# Patient Record
Sex: FEMALE | Race: ASIAN | Employment: FULL TIME | URBAN - METROPOLITAN AREA
[De-identification: names, ages, dates, MRNs, and addresses within clinical notes are randomized per-mention and may not be internally consistent; named-entity substitution may affect disease eponyms.]

---

## 2018-08-09 ENCOUNTER — OFFICE VISIT (OUTPATIENT)
Dept: INTERNAL MEDICINE CLINIC | Facility: CLINIC | Age: 43
End: 2018-08-09
Payer: COMMERCIAL

## 2018-08-09 ENCOUNTER — LAB ENCOUNTER (OUTPATIENT)
Dept: LAB | Age: 43
End: 2018-08-09
Attending: INTERNAL MEDICINE
Payer: COMMERCIAL

## 2018-08-09 VITALS
HEIGHT: 64 IN | WEIGHT: 141 LBS | RESPIRATION RATE: 16 BRPM | SYSTOLIC BLOOD PRESSURE: 105 MMHG | DIASTOLIC BLOOD PRESSURE: 72 MMHG | HEART RATE: 71 BPM | BODY MASS INDEX: 24.07 KG/M2

## 2018-08-09 DIAGNOSIS — Z91.018 FOOD ALLERGY: ICD-10-CM

## 2018-08-09 DIAGNOSIS — Z12.31 SCREENING MAMMOGRAM, ENCOUNTER FOR: ICD-10-CM

## 2018-08-09 DIAGNOSIS — Z00.00 PHYSICAL EXAM: ICD-10-CM

## 2018-08-09 DIAGNOSIS — Z00.00 PHYSICAL EXAM: Primary | ICD-10-CM

## 2018-08-09 PROBLEM — L98.9 SKIN LESION OF HAND: Status: ACTIVE | Noted: 2018-08-09

## 2018-08-09 LAB
ALBUMIN SERPL BCP-MCNC: 4.1 G/DL (ref 3.5–4.8)
ALBUMIN/GLOB SERPL: 1.3 {RATIO} (ref 1–2)
ALP SERPL-CCNC: 31 U/L (ref 32–100)
ALT SERPL-CCNC: 16 U/L (ref 14–54)
ANION GAP SERPL CALC-SCNC: 6 MMOL/L (ref 0–18)
AST SERPL-CCNC: 20 U/L (ref 15–41)
BASOPHILS # BLD: 0 K/UL (ref 0–0.2)
BASOPHILS NFR BLD: 0 %
BILIRUB SERPL-MCNC: 0.4 MG/DL (ref 0.3–1.2)
BUN SERPL-MCNC: 10 MG/DL (ref 8–20)
BUN/CREAT SERPL: 14.3 (ref 10–20)
CALCIUM SERPL-MCNC: 9.2 MG/DL (ref 8.5–10.5)
CHLORIDE SERPL-SCNC: 109 MMOL/L (ref 95–110)
CHOLEST SERPL-MCNC: 178 MG/DL (ref 110–200)
CO2 SERPL-SCNC: 24 MMOL/L (ref 22–32)
CREAT SERPL-MCNC: 0.7 MG/DL (ref 0.5–1.5)
EOSINOPHIL # BLD: 0.1 K/UL (ref 0–0.7)
EOSINOPHIL NFR BLD: 1 %
ERYTHROCYTE [DISTWIDTH] IN BLOOD BY AUTOMATED COUNT: 15.9 % (ref 11–15)
GLOBULIN PLAS-MCNC: 3.1 G/DL (ref 2.5–3.7)
GLUCOSE SERPL-MCNC: 93 MG/DL (ref 70–99)
HCT VFR BLD AUTO: 32.1 % (ref 35–48)
HDLC SERPL-MCNC: 60 MG/DL
HGB BLD-MCNC: 10.6 G/DL (ref 12–16)
LDLC SERPL CALC-MCNC: 109 MG/DL (ref 0–99)
LYMPHOCYTES # BLD: 1.4 K/UL (ref 1–4)
LYMPHOCYTES NFR BLD: 29 %
MCH RBC QN AUTO: 26.3 PG (ref 27–32)
MCHC RBC AUTO-ENTMCNC: 33 G/DL (ref 32–37)
MCV RBC AUTO: 79.7 FL (ref 80–100)
MONOCYTES # BLD: 0.4 K/UL (ref 0–1)
MONOCYTES NFR BLD: 9 %
NEUTROPHILS # BLD AUTO: 3 K/UL (ref 1.8–7.7)
NEUTROPHILS NFR BLD: 61 %
NONHDLC SERPL-MCNC: 118 MG/DL
OSMOLALITY UR CALC.SUM OF ELEC: 287 MOSM/KG (ref 275–295)
PATIENT FASTING: YES
PLATELET # BLD AUTO: 425 K/UL (ref 140–400)
PMV BLD AUTO: 8 FL (ref 7.4–10.3)
POTASSIUM SERPL-SCNC: 4.4 MMOL/L (ref 3.3–5.1)
PROT SERPL-MCNC: 7.2 G/DL (ref 5.9–8.4)
RBC # BLD AUTO: 4.03 M/UL (ref 3.7–5.4)
SODIUM SERPL-SCNC: 139 MMOL/L (ref 136–144)
TRIGL SERPL-MCNC: 44 MG/DL (ref 1–149)
TSH SERPL-ACNC: 0.7 UIU/ML (ref 0.45–5.33)
WBC # BLD AUTO: 4.9 K/UL (ref 4–11)

## 2018-08-09 PROCEDURE — 80053 COMPREHEN METABOLIC PANEL: CPT

## 2018-08-09 PROCEDURE — 85025 COMPLETE CBC W/AUTO DIFF WBC: CPT

## 2018-08-09 PROCEDURE — 99386 PREV VISIT NEW AGE 40-64: CPT | Performed by: INTERNAL MEDICINE

## 2018-08-09 PROCEDURE — 80061 LIPID PANEL: CPT

## 2018-08-09 PROCEDURE — 36415 COLL VENOUS BLD VENIPUNCTURE: CPT

## 2018-08-09 PROCEDURE — 84443 ASSAY THYROID STIM HORMONE: CPT

## 2018-08-09 RX ORDER — EPINEPHRINE 0.3 MG/.3ML
0.3 INJECTION SUBCUTANEOUS ONCE
Qty: 1 EACH | Refills: 0 | Status: SHIPPED | OUTPATIENT
Start: 2018-08-09 | End: 2018-08-09

## 2018-08-09 NOTE — PROGRESS NOTES
Devin Izaguirre is a 41year old female.   Patient presents with:  Physical      HPI:   Pt comes as a new pt   Her  moved to [employer] [facility] [employer] as a nurse and planning on going back to school for NP   C/c physical  C/o physical   Has peeling dry skin and u developed, well nourished,in no apparent distress  SKIN: no rashes,no suspicious lesions  HEENT: atraumatic, normocephalic,ears and throat are clear, no frontal or maxillary sinus tenderness   NECK: supple,no adenopathy,non tender   LUNGS: clear to auscult

## 2018-08-09 NOTE — PATIENT INSTRUCTIONS
Prevention Guidelines, Women Ages 36 to 52  Screening tests and vaccines are an important part of managing your health. A screening test is done to find possible disorders or diseases in people who don't have any symptoms.  The goal is to find a disease e Depression All women in this age group At routine exams   Gonorrhea Sexually active women at increased risk for infection At routine exams   Hepatitis C Anyone at increased risk; 1 time for those born between [facility] At routine exams   High cholester Meningococcal Women at increased risk for infection–talk with your healthcare provider 1 or more doses   Pneumococcal conjugate vaccine (PCV13) and pneumococcal polysaccharide vaccine (PPSV23) Women at increased risk for infection–talk with your healthcare

## 2018-11-15 ENCOUNTER — OFFICE VISIT (OUTPATIENT)
Dept: INTERNAL MEDICINE CLINIC | Facility: CLINIC | Age: 43
End: 2018-11-15
Payer: COMMERCIAL

## 2018-11-15 VITALS
SYSTOLIC BLOOD PRESSURE: 135 MMHG | DIASTOLIC BLOOD PRESSURE: 83 MMHG | WEIGHT: 146 LBS | HEART RATE: 87 BPM | BODY MASS INDEX: 24.92 KG/M2 | HEIGHT: 64 IN

## 2018-11-15 DIAGNOSIS — L98.9 SKIN LESION OF HAND: Primary | ICD-10-CM

## 2018-11-15 DIAGNOSIS — L25.9 CONTACT DERMATITIS, UNSPECIFIED CONTACT DERMATITIS TYPE, UNSPECIFIED TRIGGER: ICD-10-CM

## 2018-11-15 DIAGNOSIS — D50.8 IRON DEFICIENCY ANEMIA SECONDARY TO INADEQUATE DIETARY IRON INTAKE: ICD-10-CM

## 2018-11-15 DIAGNOSIS — S99.922A INJURY OF TOE ON LEFT FOOT, INITIAL ENCOUNTER: ICD-10-CM

## 2018-11-15 DIAGNOSIS — L98.9 SKIN LESION: ICD-10-CM

## 2018-11-15 PROCEDURE — 99212 OFFICE O/P EST SF 10 MIN: CPT | Performed by: INTERNAL MEDICINE

## 2018-11-15 PROCEDURE — 99214 OFFICE O/P EST MOD 30 MIN: CPT | Performed by: INTERNAL MEDICINE

## 2018-11-15 RX ORDER — CHLORHEXIDINE GLUCONATE 4 G/100ML
30 SOLUTION TOPICAL
Qty: 473 ML | Refills: 0 | Status: SHIPPED | OUTPATIENT
Start: 2018-11-15 | End: 2019-01-09

## 2018-11-15 NOTE — PATIENT INSTRUCTIONS
Non-scented for hypoallergenic sensitive skin type products that are non-fragmented--she states she already does  Asked her try Pepcid 20 mg twice a day, Benadryl 25 mg twice a day or at least at night, loratadine 10 mg once a day  Advised patient to use g is:  · Blistered  · Burning  · Cracked  · Dry  · Itchy  · Painful  · Red  · Rough, thickened, and leathery  · Swollen  · Warm  The blisters may ooze fluid and form crusts.   Treatment for contact dermatitis  Treatment is done to help relieve itching and red reserved. This information is not intended as a substitute for professional medical care. Always follow your healthcare professional's instructions.         Contact Dermatitis  Contact dermatitis is a skin rash caused by something that touches the skin and wet piece of clothing under a dry one. Wear a damp shirt under a dry shirt if your upper body is affected. This can relieve itching and prevent you from scratching the affected area.   · You can also help relieve large areas of itching by taking a lukewarm for professional medical care. Always follow your healthcare professional's instructions.       Non-scented for hypoallergenic sensitive skin type products that are non-fragmented--she states she already does  Asked her try Pepcid 20 mg twice a day, [employer]

## 2018-11-15 NOTE — PROGRESS NOTES
Jovita Albrecht is a 41year old female.   Patient presents with:  Rash: face, arms and legs itchy      HPI:   Pt comes as an urgent visit   C/c rash  C/o rash  No changes in soaps detergents fabric softeners perfumes etc.  Has allergies and sees allergist but sweats, fatigue  SKIN:as above   RESPIRATORY: denies shortness of breath, cough, wheezing  CARDIOVASCULAR: denies chest pain on exertion, palpitations, swelling in feet  GI: denies abdominal pain and denies heartburn, nausea or vomiting  NEURO: denies head aware it may fall off , becomes more thicker painful difficult to put on his shoe any symptoms she may need to see a podiatrist to have the nail pulled out        Preventative medicine   Flu shot - utd - at work 9/18 at [employer]     The patient indicates unde

## 2019-01-09 ENCOUNTER — OFFICE VISIT (OUTPATIENT)
Dept: INTERNAL MEDICINE CLINIC | Facility: CLINIC | Age: 44
End: 2019-01-09
Payer: COMMERCIAL

## 2019-01-09 VITALS
HEIGHT: 64 IN | WEIGHT: 140 LBS | HEART RATE: 109 BPM | SYSTOLIC BLOOD PRESSURE: 128 MMHG | DIASTOLIC BLOOD PRESSURE: 86 MMHG | BODY MASS INDEX: 23.9 KG/M2

## 2019-01-09 DIAGNOSIS — R22.0 SWELLING OF FACE: ICD-10-CM

## 2019-01-09 DIAGNOSIS — R21 RASH: Primary | ICD-10-CM

## 2019-01-09 PROCEDURE — 99212 OFFICE O/P EST SF 10 MIN: CPT | Performed by: INTERNAL MEDICINE

## 2019-01-09 PROCEDURE — 99213 OFFICE O/P EST LOW 20 MIN: CPT | Performed by: INTERNAL MEDICINE

## 2019-01-09 RX ORDER — PREDNISONE 10 MG/1
TABLET ORAL
Qty: 20 TABLET | Refills: 0 | Status: SHIPPED | OUTPATIENT
Start: 2019-01-09 | End: 2019-02-20

## 2019-01-09 RX ORDER — MONTELUKAST SODIUM 10 MG/1
10 TABLET ORAL DAILY
Qty: 90 TABLET | Refills: 3 | Status: SHIPPED | OUTPATIENT
Start: 2019-01-09 | End: 2019-04-01 | Stop reason: ALTCHOICE

## 2019-01-09 NOTE — PROGRESS NOTES
Gretta Perez is a 41year old female.   Patient presents with:  Rash: face, arms very itchy has appt with Derm Monday       HPI:   Pt comes for f/u/emergent visit   C/c swollen eyes   C/o swollen eyes x 5 days to one week -- on and off x 2 weeks   No changes 1 tablet (10 mg total) by mouth daily. Disp: 90 tablet Rfl: 3   mupirocin 2 % External Ointment  Disp:  Rfl: 3   triamcinolone acetonide 0.1 % External Cream  Disp:  Rfl: 1      History reviewed. No pertinent past medical history.    Past Surgical History: prescribed prednisone, take the Benadryl twice a day if needed  Today is wed - Go to work tomorrow because it was over time and she is off on Friday and Saturday she has to go back but she will call me on Friday to let me know if she is fine enough to go b

## 2019-01-09 NOTE — PATIENT INSTRUCTIONS
Controlling Asthma Triggers: Allergens     Wash bedding in hot water (130°F) each week. For many people with lung problems such as asthma or COPD, inhaling allergens leads to inflamed airways.  Allergens also cause other types of reactions in some peo Mold grows in damp places, such as bathrooms, basements, and closets. It can grow anywhere flooding or a fire has caused water damage. Mold can live behind the walls if there has been water damage. ? Clean damp areas weekly to prevent mold growth.  This in Take Pepcid 20 mg twice daily for the next 7 days, take loratadine or Claritin 10 mg once a day for the next 7 days, take the prescribed prednisone, take the Benadryl twice a day if needed

## 2019-01-14 ENCOUNTER — OFFICE VISIT (OUTPATIENT)
Dept: DERMATOLOGY CLINIC | Facility: CLINIC | Age: 44
End: 2019-01-14
Payer: COMMERCIAL

## 2019-01-14 DIAGNOSIS — L30.9 DERMATITIS: ICD-10-CM

## 2019-01-14 DIAGNOSIS — L30.1 CHRONIC VESICULAR HAND DERMATITIS: Primary | ICD-10-CM

## 2019-01-14 PROCEDURE — 99202 OFFICE O/P NEW SF 15 MIN: CPT | Performed by: DERMATOLOGY

## 2019-01-14 PROCEDURE — 99212 OFFICE O/P EST SF 10 MIN: CPT | Performed by: DERMATOLOGY

## 2019-01-14 NOTE — PROGRESS NOTES
History reviewed. No pertinent past medical history.   Past Surgical History:   Procedure Laterality Date   •      •      • OTHER SURGICAL HISTORY      exploratory lap after giving birth due to extra uterine bleed      Social History

## 2019-01-14 NOTE — PROGRESS NOTES
HPI:     Chief Complaint     Lesion        HPI     Lesion      Additional comments: New pt. Pt presents c/o itchy lesions on hands that blister present for several months that come and go.  Pt has tried Clobetasol, Fluocinonide, Eucrisa and Mupirocin withou mupirocin 2 % External Ointment  Disp:  Rfl: 3   triamcinolone acetonide 0.1 % External Cream  Disp:  Rfl: 1   Montelukast Sodium (SINGULAIR) 10 MG Oral Tab Take 1 tablet (10 mg total) by mouth daily.  Disp: 90 tablet Rfl: 3     Allergies:   No Known Tenzin times does not respond to even high potency topical steroids, I think patient should be patch tested.   She will set up appointments for patch testing today.-I have told her she needs to be off the prednisone at least a week before the patch testing and dominguez

## 2019-02-20 ENCOUNTER — OFFICE VISIT (OUTPATIENT)
Dept: INTERNAL MEDICINE CLINIC | Facility: CLINIC | Age: 44
End: 2019-02-20
Payer: COMMERCIAL

## 2019-02-20 VITALS
SYSTOLIC BLOOD PRESSURE: 128 MMHG | WEIGHT: 144.81 LBS | BODY MASS INDEX: 24.72 KG/M2 | HEART RATE: 82 BPM | DIASTOLIC BLOOD PRESSURE: 74 MMHG | HEIGHT: 64 IN

## 2019-02-20 DIAGNOSIS — L98.9 SKIN LESION OF HAND: Primary | ICD-10-CM

## 2019-02-20 PROCEDURE — 99213 OFFICE O/P EST LOW 20 MIN: CPT | Performed by: INTERNAL MEDICINE

## 2019-02-20 PROCEDURE — 99212 OFFICE O/P EST SF 10 MIN: CPT | Performed by: INTERNAL MEDICINE

## 2019-02-20 RX ORDER — PREDNISONE 20 MG/1
20 TABLET ORAL DAILY
Qty: 4 TABLET | Refills: 0 | Status: SHIPPED | OUTPATIENT
Start: 2019-02-20 | End: 2019-02-24

## 2019-02-20 NOTE — PROGRESS NOTES
Serene Grove is a 41year old female.   Patient presents with:  Rash: follow up      HPI:   Pt comes for f/u  C/c rash  C/o rash is getting worse   Pt has apt on march 12th, 14th and 16th  for skintesting - and has a f/u apt on 30th   Was on eucresa oint whi kg/m²   GENERAL: well developed, well nourished,in no apparent distress  SKIN: Right hand medial aspect of the her little finger more than the left hand and also on the palmar surface few dry patches mostly on the left hand ring finger and little finger, n

## 2019-02-21 ENCOUNTER — OFFICE VISIT (OUTPATIENT)
Dept: DERMATOLOGY CLINIC | Facility: CLINIC | Age: 44
End: 2019-02-21
Payer: COMMERCIAL

## 2019-02-21 DIAGNOSIS — L30.9 DERMATITIS: ICD-10-CM

## 2019-02-21 DIAGNOSIS — L30.1 CHRONIC VESICULAR HAND DERMATITIS: Primary | ICD-10-CM

## 2019-02-21 PROCEDURE — 99213 OFFICE O/P EST LOW 20 MIN: CPT | Performed by: DERMATOLOGY

## 2019-02-21 PROCEDURE — 99212 OFFICE O/P EST SF 10 MIN: CPT | Performed by: DERMATOLOGY

## 2019-02-21 RX ORDER — EMOLLIENT COMBINATION NO.32
1 EMULSION, EXTENDED RELEASE TOPICAL 2 TIMES DAILY
Qty: 225 G | Refills: 3 | Status: SHIPPED | OUTPATIENT
Start: 2019-02-21 | End: 2019-10-08

## 2019-02-21 RX ORDER — CEPHALEXIN 500 MG/1
500 CAPSULE ORAL 2 TIMES DAILY
Qty: 14 CAPSULE | Refills: 1 | Status: SHIPPED | OUTPATIENT
Start: 2019-02-21 | End: 2019-02-28

## 2019-02-21 NOTE — PROGRESS NOTES
HPI:     Chief Complaint     Lesion        HPI     Lesion      Additional comments: LOV 1/14/19. pt presenting today with possible rash to both hands. c/o of itching and dryness.           Last edited by Laila Camara, [address] [facility] on 2/21/2019  2:34 PM. (History) 225 g Rfl: 3   Montelukast Sodium (SINGULAIR) 10 MG Oral Tab Take 1 tablet (10 mg total) by mouth daily. Disp: 90 tablet Rfl: 3   predniSONE 20 MG Oral Tab Take 1 tablet (20 mg total) by mouth daily for 4 days.  Disp: 4 tablet Rfl: 0   mupirocin 2 % Externa sexual activity: Not on file    Other Topics      Concerns:        Grew up on a farm: Not Asked        History of tanning: Not Asked        Outdoor occupation: Not Asked        Breast feeding: Not Asked        Reaction to local anesthetic: No    Social His clobetasol in this area. Patient will follow-up in 2 weeks for the patch testing. I did recommend that she call us in a few days to let us know if she is getting improvement from the topicals.     No orders of the defined types were placed in this encount

## 2019-03-12 ENCOUNTER — OFFICE VISIT (OUTPATIENT)
Dept: DERMATOLOGY CLINIC | Facility: CLINIC | Age: 44
End: 2019-03-12
Payer: COMMERCIAL

## 2019-03-12 DIAGNOSIS — L30.1 CHRONIC VESICULAR HAND DERMATITIS: Primary | ICD-10-CM

## 2019-03-12 PROCEDURE — 95044 PATCH/APPLICATION TESTS: CPT | Performed by: DERMATOLOGY

## 2019-03-12 NOTE — PROGRESS NOTES
HPI:     Chief Complaint     Derm Problem        HPI     Derm Problem      Additional comments: LOV 02/21/2019 Patient present for patch test # 1          Last edited by Wolf Cervantes on 3/12/2019 10:11 AM. (History)        Patient is here today for Yes        Comment: social       Drug use: No      Sexual activity: Not on file    Lifestyle      Physical activity:        Days per week: Not on file        Minutes per session: Not on file      Stress: Not on file    Relationships      Social connections

## 2019-03-14 ENCOUNTER — OFFICE VISIT (OUTPATIENT)
Dept: DERMATOLOGY CLINIC | Facility: CLINIC | Age: 44
End: 2019-03-14
Payer: COMMERCIAL

## 2019-03-14 DIAGNOSIS — L30.9 DERMATITIS: Primary | ICD-10-CM

## 2019-03-14 PROCEDURE — 99212 OFFICE O/P EST SF 10 MIN: CPT | Performed by: DERMATOLOGY

## 2019-03-14 PROCEDURE — 99211 OFF/OP EST MAY X REQ PHY/QHP: CPT | Performed by: DERMATOLOGY

## 2019-03-16 ENCOUNTER — OFFICE VISIT (OUTPATIENT)
Dept: DERMATOLOGY CLINIC | Facility: CLINIC | Age: 44
End: 2019-03-16
Payer: COMMERCIAL

## 2019-03-16 DIAGNOSIS — L30.1 CHRONIC VESICULAR HAND DERMATITIS: Primary | ICD-10-CM

## 2019-03-16 PROCEDURE — 99212 OFFICE O/P EST SF 10 MIN: CPT | Performed by: DERMATOLOGY

## 2019-03-16 PROCEDURE — 99213 OFFICE O/P EST LOW 20 MIN: CPT | Performed by: DERMATOLOGY

## 2019-03-16 NOTE — PROGRESS NOTES
.  HPI:     Chief Complaint     Derm Problem        HPI     Derm Problem      Additional comments: Pt presents for patch test #3.            Last edited by Chichi Chaves RN on 3/16/2019  9:56 AM. (History)         Patient is here for her third patch test Tobacco Use      Smoking status: Never Smoker      Smokeless tobacco: Never Used    Substance and Sexual Activity      Alcohol use: Yes        Comment: social       Drug use: No      Sexual activity: Not on file    Lifestyle      Physical activity: more problems on the palms were as her reaction is more on the dorsal hands. Patient will continue the clobetasol ointment along with the On license of [facility] as needed. She will follow-up if flares or new issues.     No orders of the defined types were placed in [facility]

## 2019-04-01 ENCOUNTER — OFFICE VISIT (OUTPATIENT)
Dept: INTERNAL MEDICINE CLINIC | Facility: CLINIC | Age: 44
End: 2019-04-01
Payer: COMMERCIAL

## 2019-04-01 ENCOUNTER — LAB ENCOUNTER (OUTPATIENT)
Dept: LAB | Age: 44
End: 2019-04-01
Attending: INTERNAL MEDICINE
Payer: COMMERCIAL

## 2019-04-01 VITALS
WEIGHT: 146 LBS | BODY MASS INDEX: 24.92 KG/M2 | SYSTOLIC BLOOD PRESSURE: 122 MMHG | DIASTOLIC BLOOD PRESSURE: 75 MMHG | HEART RATE: 83 BPM | TEMPERATURE: 98 F | HEIGHT: 64 IN

## 2019-04-01 DIAGNOSIS — Z02.89 ENCOUNTER FOR COMPLETION OF FORM WITH PATIENT: ICD-10-CM

## 2019-04-01 DIAGNOSIS — L30.1 CHRONIC VESICULAR HAND DERMATITIS: Primary | ICD-10-CM

## 2019-04-01 PROCEDURE — 36415 COLL VENOUS BLD VENIPUNCTURE: CPT

## 2019-04-01 PROCEDURE — 85025 COMPLETE CBC W/AUTO DIFF WBC: CPT

## 2019-04-01 PROCEDURE — 99213 OFFICE O/P EST LOW 20 MIN: CPT | Performed by: INTERNAL MEDICINE

## 2019-04-01 PROCEDURE — 80053 COMPREHEN METABOLIC PANEL: CPT

## 2019-04-01 PROCEDURE — 99212 OFFICE O/P EST SF 10 MIN: CPT | Performed by: INTERNAL MEDICINE

## 2019-04-01 PROCEDURE — 80061 LIPID PANEL: CPT

## 2019-04-01 RX ORDER — CLOBETASOL PROPIONATE 0.5 MG/G
OINTMENT TOPICAL AS NEEDED
COMMUNITY
End: 2020-09-08

## 2019-04-01 NOTE — PROGRESS NOTES
Chelly Grey is a 41year old female. Patient presents with:  Complete Form: Per patient work needs form for work, wellness check form.  Patient did not bring form to office visit.         waist circum- 34 in       HPI:   Pt here for f/u   C/c rash on hands External Ointment  Disp:  Rfl: 3   triamcinolone acetonide 0.1 % External Cream  Disp:  Rfl: 1      No past medical history on file.    Past Surgical History:   Procedure Laterality Date   •      •      • Other surgical history      expl

## 2019-04-02 ENCOUNTER — MED REC SCAN ONLY (OUTPATIENT)
Dept: INTERNAL MEDICINE CLINIC | Facility: CLINIC | Age: 44
End: 2019-04-02

## 2019-08-26 ENCOUNTER — LAB ENCOUNTER (OUTPATIENT)
Dept: LAB | Age: 44
End: 2019-08-26
Attending: INTERNAL MEDICINE
Payer: COMMERCIAL

## 2019-08-26 ENCOUNTER — OFFICE VISIT (OUTPATIENT)
Dept: INTERNAL MEDICINE CLINIC | Facility: CLINIC | Age: 44
End: 2019-08-26
Payer: COMMERCIAL

## 2019-08-26 ENCOUNTER — APPOINTMENT (OUTPATIENT)
Dept: LAB | Age: 44
End: 2019-08-26
Attending: INTERNAL MEDICINE
Payer: COMMERCIAL

## 2019-08-26 VITALS
BODY MASS INDEX: 25.54 KG/M2 | SYSTOLIC BLOOD PRESSURE: 120 MMHG | TEMPERATURE: 98 F | HEART RATE: 74 BPM | DIASTOLIC BLOOD PRESSURE: 77 MMHG | WEIGHT: 149.63 LBS | HEIGHT: 64 IN

## 2019-08-26 DIAGNOSIS — R07.89 ATYPICAL CHEST PAIN: ICD-10-CM

## 2019-08-26 DIAGNOSIS — S16.1XXA STRAIN OF NECK MUSCLE, INITIAL ENCOUNTER: ICD-10-CM

## 2019-08-26 DIAGNOSIS — R12 HEARTBURN: ICD-10-CM

## 2019-08-26 DIAGNOSIS — Z01.419 ENCOUNTER FOR ROUTINE GYNECOLOGICAL EXAMINATION WITH PAPANICOLAOU SMEAR OF CERVIX: ICD-10-CM

## 2019-08-26 DIAGNOSIS — Z12.31 SCREENING MAMMOGRAM, ENCOUNTER FOR: ICD-10-CM

## 2019-08-26 DIAGNOSIS — D50.0 IRON DEFICIENCY ANEMIA DUE TO CHRONIC BLOOD LOSS: ICD-10-CM

## 2019-08-26 DIAGNOSIS — Z00.00 PHYSICAL EXAM: ICD-10-CM

## 2019-08-26 DIAGNOSIS — Z00.00 PHYSICAL EXAM: Primary | ICD-10-CM

## 2019-08-26 LAB
ALBUMIN SERPL-MCNC: 3.9 G/DL (ref 3.4–5)
ALBUMIN/GLOB SERPL: 1 {RATIO} (ref 1–2)
ALP LIVER SERPL-CCNC: 44 U/L (ref 37–98)
ALT SERPL-CCNC: 20 U/L (ref 13–56)
ANION GAP SERPL CALC-SCNC: 7 MMOL/L (ref 0–18)
AST SERPL-CCNC: 15 U/L (ref 15–37)
BASOPHILS # BLD AUTO: 0.06 X10(3) UL (ref 0–0.2)
BASOPHILS NFR BLD AUTO: 1.1 %
BILIRUB SERPL-MCNC: 0.3 MG/DL (ref 0.1–2)
BUN BLD-MCNC: 12 MG/DL (ref 7–18)
BUN/CREAT SERPL: 16.4 (ref 10–20)
CALCIUM BLD-MCNC: 8.9 MG/DL (ref 8.5–10.1)
CHLORIDE SERPL-SCNC: 108 MMOL/L (ref 98–112)
CHOLEST SMN-MCNC: 213 MG/DL (ref ?–200)
CO2 SERPL-SCNC: 26 MMOL/L (ref 21–32)
CREAT BLD-MCNC: 0.73 MG/DL (ref 0.55–1.02)
DEPRECATED RDW RBC AUTO: 46.7 FL (ref 35.1–46.3)
EOSINOPHIL # BLD AUTO: 0.09 X10(3) UL (ref 0–0.7)
EOSINOPHIL NFR BLD AUTO: 1.6 %
ERYTHROCYTE [DISTWIDTH] IN BLOOD BY AUTOMATED COUNT: 15.9 % (ref 11–15)
GLOBULIN PLAS-MCNC: 4 G/DL (ref 2.8–4.4)
GLUCOSE BLD-MCNC: 93 MG/DL (ref 70–99)
HCT VFR BLD AUTO: 33.9 % (ref 35–48)
HDLC SERPL-MCNC: 82 MG/DL (ref 40–59)
HGB BLD-MCNC: 10.5 G/DL (ref 12–16)
IMM GRANULOCYTES # BLD AUTO: 0.01 X10(3) UL (ref 0–1)
IMM GRANULOCYTES NFR BLD: 0.2 %
LDLC SERPL CALC-MCNC: 117 MG/DL (ref ?–100)
LYMPHOCYTES # BLD AUTO: 1.64 X10(3) UL (ref 1–4)
LYMPHOCYTES NFR BLD AUTO: 28.8 %
M PROTEIN MFR SERPL ELPH: 7.9 G/DL (ref 6.4–8.2)
MCH RBC QN AUTO: 25.3 PG (ref 26–34)
MCHC RBC AUTO-ENTMCNC: 31 G/DL (ref 31–37)
MCV RBC AUTO: 81.7 FL (ref 80–100)
MONOCYTES # BLD AUTO: 0.48 X10(3) UL (ref 0.1–1)
MONOCYTES NFR BLD AUTO: 8.4 %
NEUTROPHILS # BLD AUTO: 3.42 X10 (3) UL (ref 1.5–7.7)
NEUTROPHILS # BLD AUTO: 3.42 X10(3) UL (ref 1.5–7.7)
NEUTROPHILS NFR BLD AUTO: 59.9 %
NONHDLC SERPL-MCNC: 131 MG/DL (ref ?–130)
OSMOLALITY SERPL CALC.SUM OF ELEC: 291 MOSM/KG (ref 275–295)
PATIENT FASTING: YES
PATIENT FASTING: YES
PLATELET # BLD AUTO: 500 10(3)UL (ref 150–450)
POTASSIUM SERPL-SCNC: 4.2 MMOL/L (ref 3.5–5.1)
RBC # BLD AUTO: 4.15 X10(6)UL (ref 3.8–5.3)
SODIUM SERPL-SCNC: 141 MMOL/L (ref 136–145)
TRIGL SERPL-MCNC: 68 MG/DL (ref 30–149)
TSI SER-ACNC: 1.44 MIU/ML (ref 0.36–3.74)
VLDLC SERPL CALC-MCNC: 14 MG/DL (ref 0–30)
WBC # BLD AUTO: 5.7 X10(3) UL (ref 4–11)

## 2019-08-26 PROCEDURE — 80061 LIPID PANEL: CPT

## 2019-08-26 PROCEDURE — 93010 ELECTROCARDIOGRAM REPORT: CPT | Performed by: INTERNAL MEDICINE

## 2019-08-26 PROCEDURE — 99396 PREV VISIT EST AGE 40-64: CPT | Performed by: INTERNAL MEDICINE

## 2019-08-26 PROCEDURE — 80053 COMPREHEN METABOLIC PANEL: CPT

## 2019-08-26 PROCEDURE — 85025 COMPLETE CBC W/AUTO DIFF WBC: CPT

## 2019-08-26 PROCEDURE — 93005 ELECTROCARDIOGRAM TRACING: CPT

## 2019-08-26 PROCEDURE — 84443 ASSAY THYROID STIM HORMONE: CPT

## 2019-08-26 PROCEDURE — 36415 COLL VENOUS BLD VENIPUNCTURE: CPT

## 2019-08-26 RX ORDER — CYCLOBENZAPRINE HCL 10 MG
10 TABLET ORAL NIGHTLY PRN
Qty: 10 TABLET | Refills: 0 | Status: SHIPPED | OUTPATIENT
Start: 2019-08-26 | End: 2019-09-15

## 2019-08-26 RX ORDER — CYCLOBENZAPRINE HCL 10 MG
10 TABLET ORAL 3 TIMES DAILY
Qty: 30 TABLET | Refills: 1 | Status: SHIPPED | OUTPATIENT
Start: 2019-08-26 | End: 2019-08-26

## 2019-08-26 NOTE — PATIENT INSTRUCTIONS
Prevention Guidelines, Women Ages 36 to 52  Screening tests and vaccines are an important part of managing your health. A screening test is done to find diseases in people who don't have any symptoms.  The goal is to find a disease early so lifestyle pineda · Flexible sigmoidoscopy every 5 years, or  · Colonoscopy every 10 years, or  · CT colonography (virtual colonoscopy) every 5 years, or  · Yearly fecal occult blood test, or  · Yearly fecal immunochemical test every year, or  · Stool DNA test, every 3 year Chickenpox (varicella) All women in this age group who have no record of this infection or vaccine 2 doses; the second dose should be given at least 4 weeks after the first dose   Hepatitis A Women at increased risk for infection–talk with your healthcare Use of tobacco and the health effects it can cause All women in this age group Every exam   1 American Diabetes Association  2 American College of Obstetricians and Gynecologists   [address]  [address] [facility]  Date Last R

## 2019-08-26 NOTE — PROGRESS NOTES
Tamika Lucas is a 45year old female.   Patient presents with:  Physical: annual px      HPI:   Pt comes for a physical  C/c physical   C/o 4 days ago had come chest pressure and out like some skipped beats and her heart rate so she went to work and she had mupirocin 2 % External Ointment  Disp:  Rfl: 3   triamcinolone acetonide 0.1 % External Cream  Disp:  Rfl: 1      No past medical history on file.    Past Surgical History:   Procedure Laterality Date   •      •      • Other surgical histo with flexion looking to the right or looking to the left,   LUNGS: clear to auscultation, no wheeze  CARDIO: RRR without murmur  GI: good BS's,no masses or tenderness  EXTREMITIES: no cyanosis, or edema  BREAST: Bilateral breasts without any lumps, bilater

## 2019-10-09 RX ORDER — EMOLLIENT COMBINATION NO.32
EMULSION, EXTENDED RELEASE TOPICAL
Qty: 225 G | Refills: 2 | Status: SHIPPED | OUTPATIENT
Start: 2019-10-09

## 2019-10-11 ENCOUNTER — OFFICE VISIT (OUTPATIENT)
Dept: OBGYN CLINIC | Facility: CLINIC | Age: 44
End: 2019-10-11
Payer: COMMERCIAL

## 2019-10-11 VITALS
SYSTOLIC BLOOD PRESSURE: 111 MMHG | HEART RATE: 92 BPM | WEIGHT: 150 LBS | BODY MASS INDEX: 26 KG/M2 | DIASTOLIC BLOOD PRESSURE: 66 MMHG

## 2019-10-11 DIAGNOSIS — Z01.419 ENCOUNTER FOR GYNECOLOGICAL EXAMINATION: Primary | ICD-10-CM

## 2019-10-11 DIAGNOSIS — Z12.4 SCREENING FOR MALIGNANT NEOPLASM OF CERVIX: ICD-10-CM

## 2019-10-11 DIAGNOSIS — Z12.31 ENCOUNTER FOR SCREENING MAMMOGRAM FOR BREAST CANCER: ICD-10-CM

## 2019-10-11 PROCEDURE — 99386 PREV VISIT NEW AGE 40-64: CPT | Performed by: OBSTETRICS & GYNECOLOGY

## 2019-10-11 NOTE — PROGRESS NOTES
Clayton Kelsey is a 45year old female [id number] Patient's last menstrual period was 2019. here for annual exam.       New pt. Menses q month x 5 days. Condoms. Declined STD screen.     States she had exploratory lap after last  due to internal blee 92   Wt 150 lb (68 kg)   LMP 09/14/2019   BMI 25.75 kg/m²   Wt Readings from Last 2 Encounters:  10/11/19 : 150 lb (68 kg)  08/26/19 : 149 lb 9.6 oz (67.9 kg)    Body mass index is 25.75 kg/m².     Constitutional: well developed, well nourished  Neck/Thyroi

## 2019-11-10 RX ORDER — EMOLLIENT COMBINATION NO.32
EMULSION, EXTENDED RELEASE TOPICAL
Qty: 225 G | Refills: 5 | Status: SHIPPED | OUTPATIENT
Start: 2019-11-10 | End: 2020-11-27

## 2019-12-03 NOTE — PROGRESS NOTES
Normal pap and negative HPV.   Addressed in my chart Clindamycin Pregnancy And Lactation Text: This medication can be used in pregnancy if certain situations. Clindamycin is also present in breast milk.

## 2019-12-11 ENCOUNTER — OFFICE VISIT (OUTPATIENT)
Dept: INTERNAL MEDICINE CLINIC | Facility: CLINIC | Age: 44
End: 2019-12-11
Payer: COMMERCIAL

## 2019-12-11 VITALS
HEIGHT: 64 IN | TEMPERATURE: 98 F | DIASTOLIC BLOOD PRESSURE: 84 MMHG | BODY MASS INDEX: 24.75 KG/M2 | HEART RATE: 96 BPM | SYSTOLIC BLOOD PRESSURE: 127 MMHG | WEIGHT: 145 LBS | RESPIRATION RATE: 20 BRPM

## 2019-12-11 DIAGNOSIS — R09.82 PND (POST-NASAL DRIP): ICD-10-CM

## 2019-12-11 DIAGNOSIS — J06.9 VIRAL UPPER RESPIRATORY TRACT INFECTION: Primary | ICD-10-CM

## 2019-12-11 PROCEDURE — 99213 OFFICE O/P EST LOW 20 MIN: CPT | Performed by: INTERNAL MEDICINE

## 2019-12-11 RX ORDER — BENZONATATE 100 MG/1
100 CAPSULE ORAL 2 TIMES DAILY PRN
Qty: 20 CAPSULE | Refills: 0 | Status: SHIPPED | OUTPATIENT
Start: 2019-12-11 | End: 2020-09-01 | Stop reason: ALTCHOICE

## 2019-12-11 NOTE — PROGRESS NOTES
Gretta Perez is a 45year old female.   Patient presents with:  Cough: Patient present for cough,congestion and chills - facial pressure      HPI:   Pt comes for f/u   C/c uri x 2 days   C/o  Mom was sick first -- then passed it to  and her son and he Surgical History:   Procedure Laterality Date   •      •      • Other surgical history  2013    exploratory lap after giving birth due to extra uterine bleed       Social History:  Social History    Tobacco Use      Smoking status: Never Sm 4/19 reviewed   Mammogram - ordered , never had one                 [facility] clinical breast , reviewed breast exam and ordered mammogram  Pap- 10/19 normal dr Jessika Ugarte          The patient indicates understanding of these issues and agrees to the plan.   No follow-u

## 2020-02-04 ENCOUNTER — HOSPITAL ENCOUNTER (OUTPATIENT)
Dept: MAMMOGRAPHY | Facility: HOSPITAL | Age: 45
Discharge: HOME | End: 2020-02-04
Attending: OBSTETRICS & GYNECOLOGY
Payer: COMMERCIAL

## 2020-02-04 DIAGNOSIS — Z12.31 ENCOUNTER FOR SCREENING MAMMOGRAM FOR BREAST CANCER: ICD-10-CM

## 2020-02-04 PROCEDURE — 77063 BREAST TOMOSYNTHESIS BI: CPT | Performed by: OBSTETRICS & GYNECOLOGY

## 2020-02-04 PROCEDURE — 77067 SCR MAMMO BI INCL CAD: CPT | Performed by: OBSTETRICS & GYNECOLOGY

## 2020-02-14 ENCOUNTER — HOSPITAL ENCOUNTER (OUTPATIENT)
Dept: MAMMOGRAPHY | Facility: HOSPITAL | Age: 45
Discharge: HOME | End: 2020-02-14
Attending: OBSTETRICS & GYNECOLOGY
Payer: COMMERCIAL

## 2020-02-14 DIAGNOSIS — R92.8 ABNORMAL MAMMOGRAM: ICD-10-CM

## 2020-02-14 PROCEDURE — 77065 DX MAMMO INCL CAD UNI: CPT | Performed by: OBSTETRICS & GYNECOLOGY

## 2020-02-14 PROCEDURE — 77061 BREAST TOMOSYNTHESIS UNI: CPT | Performed by: OBSTETRICS & GYNECOLOGY

## 2020-06-25 ENCOUNTER — TELEPHONE (OUTPATIENT)
Dept: INTERNAL MEDICINE CLINIC | Facility: CLINIC | Age: 45
End: 2020-06-25

## 2020-06-25 DIAGNOSIS — Z20.822 EXPOSURE TO COVID-19 VIRUS: Primary | ICD-10-CM

## 2020-06-25 NOTE — TELEPHONE ENCOUNTER
Patient requesting order for SARS-CoV-2 S1/S2 IgG antibody test. 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 Patient did not have any symptoms of COVID-19 in the past.  Please Advise.

## 2020-07-03 ENCOUNTER — APPOINTMENT (OUTPATIENT)
Dept: LAB | Age: 45
End: 2020-07-03
Attending: INTERNAL MEDICINE
Payer: COMMERCIAL

## 2020-07-03 DIAGNOSIS — Z20.822 EXPOSURE TO COVID-19 VIRUS: ICD-10-CM

## 2020-07-03 PROCEDURE — 86769 SARS-COV-2 COVID-19 ANTIBODY: CPT

## 2020-07-03 PROCEDURE — 36415 COLL VENOUS BLD VENIPUNCTURE: CPT

## 2020-07-07 LAB — SARS-COV-2 IGG SERPLBLD QL IA.RAPID: NEGATIVE

## 2020-09-01 ENCOUNTER — OFFICE VISIT (OUTPATIENT)
Dept: INTERNAL MEDICINE CLINIC | Facility: CLINIC | Age: 45
End: 2020-09-01
Payer: COMMERCIAL

## 2020-09-01 ENCOUNTER — LAB ENCOUNTER (OUTPATIENT)
Dept: LAB | Age: 45
End: 2020-09-01
Attending: INTERNAL MEDICINE
Payer: COMMERCIAL

## 2020-09-01 ENCOUNTER — OFFICE VISIT (OUTPATIENT)
Dept: DERMATOLOGY CLINIC | Facility: CLINIC | Age: 45
End: 2020-09-01
Payer: COMMERCIAL

## 2020-09-01 VITALS
DIASTOLIC BLOOD PRESSURE: 81 MMHG | BODY MASS INDEX: 25.1 KG/M2 | RESPIRATION RATE: 16 BRPM | SYSTOLIC BLOOD PRESSURE: 124 MMHG | HEIGHT: 64 IN | HEART RATE: 76 BPM | TEMPERATURE: 98 F | WEIGHT: 147 LBS

## 2020-09-01 DIAGNOSIS — Z00.00 PHYSICAL EXAM: Primary | ICD-10-CM

## 2020-09-01 DIAGNOSIS — L29.9 PRURITIC DERMATITIS: Primary | ICD-10-CM

## 2020-09-01 DIAGNOSIS — Z00.00 PHYSICAL EXAM: ICD-10-CM

## 2020-09-01 DIAGNOSIS — L81.0 HYPERPIGMENTATION OF SKIN, POSTINFLAMMATORY: ICD-10-CM

## 2020-09-01 PROBLEM — L30.8 PRURITIC DERMATITIS: Status: ACTIVE | Noted: 2020-09-01

## 2020-09-01 LAB
ALBUMIN SERPL-MCNC: 3.8 G/DL (ref 3.4–5)
ALBUMIN/GLOB SERPL: 1 {RATIO} (ref 1–2)
ALP LIVER SERPL-CCNC: 47 U/L (ref 37–98)
ALT SERPL-CCNC: 22 U/L (ref 13–56)
ANION GAP SERPL CALC-SCNC: 4 MMOL/L (ref 0–18)
AST SERPL-CCNC: 9 U/L (ref 15–37)
BASOPHILS # BLD AUTO: 0.07 X10(3) UL (ref 0–0.2)
BASOPHILS NFR BLD AUTO: 1.3 %
BILIRUB SERPL-MCNC: 0.3 MG/DL (ref 0.1–2)
BUN BLD-MCNC: 10 MG/DL (ref 7–18)
BUN/CREAT SERPL: 14.3 (ref 10–20)
CALCIUM BLD-MCNC: 8.8 MG/DL (ref 8.5–10.1)
CHLORIDE SERPL-SCNC: 109 MMOL/L (ref 98–112)
CHOLEST SMN-MCNC: 196 MG/DL (ref ?–200)
CO2 SERPL-SCNC: 27 MMOL/L (ref 21–32)
CREAT BLD-MCNC: 0.7 MG/DL (ref 0.55–1.02)
DEPRECATED RDW RBC AUTO: 43 FL (ref 35.1–46.3)
EOSINOPHIL # BLD AUTO: 0.08 X10(3) UL (ref 0–0.7)
EOSINOPHIL NFR BLD AUTO: 1.5 %
ERYTHROCYTE [DISTWIDTH] IN BLOOD BY AUTOMATED COUNT: 14.2 % (ref 11–15)
GLOBULIN PLAS-MCNC: 3.9 G/DL (ref 2.8–4.4)
GLUCOSE BLD-MCNC: 85 MG/DL (ref 70–99)
HCT VFR BLD AUTO: 34.1 % (ref 35–48)
HDLC SERPL-MCNC: 64 MG/DL (ref 40–59)
HGB BLD-MCNC: 10.9 G/DL (ref 12–16)
IMM GRANULOCYTES # BLD AUTO: 0.01 X10(3) UL (ref 0–1)
IMM GRANULOCYTES NFR BLD: 0.2 %
LDLC SERPL CALC-MCNC: 117 MG/DL (ref ?–100)
LYMPHOCYTES # BLD AUTO: 1.62 X10(3) UL (ref 1–4)
LYMPHOCYTES NFR BLD AUTO: 29.5 %
M PROTEIN MFR SERPL ELPH: 7.7 G/DL (ref 6.4–8.2)
MCH RBC QN AUTO: 26.7 PG (ref 26–34)
MCHC RBC AUTO-ENTMCNC: 32 G/DL (ref 31–37)
MCV RBC AUTO: 83.4 FL (ref 80–100)
MONOCYTES # BLD AUTO: 0.44 X10(3) UL (ref 0.1–1)
MONOCYTES NFR BLD AUTO: 8 %
NEUTROPHILS # BLD AUTO: 3.28 X10 (3) UL (ref 1.5–7.7)
NEUTROPHILS # BLD AUTO: 3.28 X10(3) UL (ref 1.5–7.7)
NEUTROPHILS NFR BLD AUTO: 59.5 %
NONHDLC SERPL-MCNC: 132 MG/DL (ref ?–130)
OSMOLALITY SERPL CALC.SUM OF ELEC: 288 MOSM/KG (ref 275–295)
PATIENT FASTING Y/N/NP: YES
PATIENT FASTING Y/N/NP: YES
PLATELET # BLD AUTO: 427 10(3)UL (ref 150–450)
POTASSIUM SERPL-SCNC: 3.9 MMOL/L (ref 3.5–5.1)
RBC # BLD AUTO: 4.09 X10(6)UL (ref 3.8–5.3)
SODIUM SERPL-SCNC: 140 MMOL/L (ref 136–145)
TRIGL SERPL-MCNC: 75 MG/DL (ref 30–149)
TSI SER-ACNC: 0.78 MIU/ML (ref 0.36–3.74)
VLDLC SERPL CALC-MCNC: 15 MG/DL (ref 0–30)
WBC # BLD AUTO: 5.5 X10(3) UL (ref 4–11)

## 2020-09-01 PROCEDURE — 3008F BODY MASS INDEX DOCD: CPT | Performed by: INTERNAL MEDICINE

## 2020-09-01 PROCEDURE — 11104 PUNCH BX SKIN SINGLE LESION: CPT | Performed by: DERMATOLOGY

## 2020-09-01 PROCEDURE — 3079F DIAST BP 80-89 MM HG: CPT | Performed by: INTERNAL MEDICINE

## 2020-09-01 PROCEDURE — 11105 PUNCH BX SKIN EA SEP/ADDL: CPT | Performed by: DERMATOLOGY

## 2020-09-01 PROCEDURE — 84443 ASSAY THYROID STIM HORMONE: CPT

## 2020-09-01 PROCEDURE — 36415 COLL VENOUS BLD VENIPUNCTURE: CPT

## 2020-09-01 PROCEDURE — 85025 COMPLETE CBC W/AUTO DIFF WBC: CPT

## 2020-09-01 PROCEDURE — 88305 TISSUE EXAM BY PATHOLOGIST: CPT | Performed by: DERMATOLOGY

## 2020-09-01 PROCEDURE — 3074F SYST BP LT 130 MM HG: CPT | Performed by: INTERNAL MEDICINE

## 2020-09-01 PROCEDURE — 80061 LIPID PANEL: CPT

## 2020-09-01 PROCEDURE — 99213 OFFICE O/P EST LOW 20 MIN: CPT | Performed by: DERMATOLOGY

## 2020-09-01 PROCEDURE — 99396 PREV VISIT EST AGE 40-64: CPT | Performed by: INTERNAL MEDICINE

## 2020-09-01 PROCEDURE — 80053 COMPREHEN METABOLIC PANEL: CPT

## 2020-09-01 NOTE — PROGRESS NOTES
Jean Claude Dong is a 39year old female.   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 No follow-ups on file.

## 2020-09-01 NOTE — PROGRESS NOTES
HPI:     Chief Complaint     Full Skin Exam        HPI     Full Skin Exam      Additional comments: LOV 3/16/2019 Patient present for full body skin exam .Patient denies any hx of sc          Last edited by Zeny Tripp, Wolf Paul on 9/1/2020  1:28 PM. (Histo Procedure Laterality Date   •      •      • OTHER SURGICAL HISTORY  2013    exploratory lap after giving birth due to extra uterine bleed      Social History    Socioeconomic History      Marital status:       Spouse name: Not on f for the following-  1. The hand dermatitis at this time is fairly inactive. Right ear there are however various papules and some nodular lesions scattered on trunk and extremities. Some erythematous, and some more brown.   Some with a somewhat keratotic

## 2020-09-01 NOTE — PATIENT INSTRUCTIONS
Prevention Guidelines, Women Ages 36 to 52  Screening tests and vaccines are an important part of managing your health. A screening test is done to find diseases in people who don't have any symptoms.  The goal is to find a disease early so lifestyle pineda · Flexible sigmoidoscopy every 5 years, or  · Colonoscopy every 10 years, or  · CT colonography (virtual colonoscopy) every 5 years, or  · Yearly fecal occult blood test, or  · Yearly fecal immunochemical test every year, or  · Stool DNA test, every 3 year Chickenpox (varicella) All women in this age group who have no record of this infection or vaccine 2 doses; the second dose should be given at least 4 weeks after the first dose   Hepatitis A Women at increased risk for infection–talk with your healthcare Use of tobacco and the health effects it can cause All women in this age group Every exam   1 American Diabetes Association  2 American College of Obstetricians and Gynecologists   [address]  [address] [facility]  [place]

## 2020-09-08 ENCOUNTER — OFFICE VISIT (OUTPATIENT)
Dept: DERMATOLOGY CLINIC | Facility: CLINIC | Age: 45
End: 2020-09-08
Payer: COMMERCIAL

## 2020-09-08 DIAGNOSIS — L29.9 PRURITIC DERMATITIS: Primary | ICD-10-CM

## 2020-09-08 PROCEDURE — 99213 OFFICE O/P EST LOW 20 MIN: CPT | Performed by: DERMATOLOGY

## 2020-09-08 RX ORDER — CLOBETASOL PROPIONATE 0.5 MG/G
OINTMENT TOPICAL
Qty: 60 G | Refills: 3 | Status: SHIPPED | OUTPATIENT
Start: 2020-09-08

## 2020-09-08 NOTE — PROGRESS NOTES
HPI:     Chief Complaint     Derm Problem        HPI     Derm Problem      Additional comments: lov 9/1/2020 Patient present to f/u on rash and suture removal           Last edited by Meri Ruby, [address] on 9/8/2020  8:25 AM. (History)          Patient p uterine bleed      Social History    Socioeconomic History      Marital status:       Spouse name: Not on file      Number of children: Not on file      Years of education: Not on file      Highest education level: Not on file    Occupational Histor visit      ASSESSMENT/PLAN:   Pruritic dermatitis  (primary encounter diagnosis)-I have told patient this is most consistent with an eczematous process and I think what she has going on in the body is similar to her hands but with some component of excoria

## 2020-11-27 RX ORDER — EMOLLIENT COMBINATION NO.32
1 EMULSION, EXTENDED RELEASE TOPICAL 2 TIMES DAILY
Qty: 225 G | Refills: 4 | Status: SHIPPED | OUTPATIENT
Start: 2020-11-27 | End: 2021-12-13

## 2020-12-02 ENCOUNTER — TELEMEDICINE (OUTPATIENT)
Dept: INTERNAL MEDICINE CLINIC | Facility: CLINIC | Age: 45
End: 2020-12-02

## 2020-12-02 DIAGNOSIS — U07.1 COVID-19: Primary | ICD-10-CM

## 2020-12-02 PROCEDURE — 99213 OFFICE O/P EST LOW 20 MIN: CPT | Performed by: INTERNAL MEDICINE

## 2020-12-02 NOTE — PROGRESS NOTES
Patient ID: Gifty Orourke is a 39year old female. Patient presents with:  Cough         HISTORY OF PRESENT ILLNESS:   Patient presents for above. This visit is conducted using Telemedicine with live, interactive video and audio.   C/c started as conversational dyspnea or respiratory distress  Occasional cough heard   ASSESSMENT/PLAN:   1.  COVID-19  Advised to quarantine for 14 days, wash hands frequently, cover coughs and sneezes, wear a mask, use a separate bathroom in quarantine  Updated chart radiology tests and decision making. Appropriate medical decision-making and tests are ordered as detailed in the plan of care above. Coding/billing information is submitted for this visit based on complexity of care and/or time spent for the visit.     Marissa Lua

## 2020-12-07 ENCOUNTER — TELEPHONE (OUTPATIENT)
Dept: INTERNAL MEDICINE CLINIC | Facility: CLINIC | Age: 45
End: 2020-12-07

## 2020-12-07 RX ORDER — BENZONATATE 100 MG/1
100 CAPSULE ORAL 2 TIMES DAILY PRN
Qty: 30 CAPSULE | Refills: 0 | Status: SHIPPED | OUTPATIENT
Start: 2020-12-07 | End: 2021-01-23

## 2020-12-07 NOTE — TELEPHONE ENCOUNTER
Lingering cough since Sunday, been 1 week, last night with fever T=101/ear ,took tylenol and then this morning T=98.9/ear, patient keep  clearing her throat,states  took Nyquil but does not want the after feeling/after effect in the morning. Been drinking w

## 2020-12-07 NOTE — TELEPHONE ENCOUNTER
Left a detailed message with below information on pt's cell phone (per LB) and call back number left with questions/concerns.

## 2020-12-07 NOTE — TELEPHONE ENCOUNTER
Called left msg -will send tessalon perles   agree with advice given and continue with Advil after meals or Tylenol

## 2020-12-08 NOTE — TELEPHONE ENCOUNTER
What  was your temp today? 98.2 F    How did you take your temp? Temporal    Are you feeling short of breath today? No      Is the shortness of breath better, the same, or worse than yesterday? N/A    Are you having a cough today?    Yes    Is the coug

## 2021-01-04 ENCOUNTER — TELEPHONE (OUTPATIENT)
Dept: INTERNAL MEDICINE CLINIC | Facility: CLINIC | Age: 46
End: 2021-01-04

## 2021-01-07 ENCOUNTER — TELEMEDICINE (OUTPATIENT)
Dept: INTERNAL MEDICINE CLINIC | Facility: CLINIC | Age: 46
End: 2021-01-07
Payer: COMMERCIAL

## 2021-01-07 DIAGNOSIS — R06.00 DOE (DYSPNEA ON EXERTION): ICD-10-CM

## 2021-01-07 DIAGNOSIS — D64.9 ANEMIA, UNSPECIFIED TYPE: ICD-10-CM

## 2021-01-07 DIAGNOSIS — Z86.16 HISTORY OF COVID-19: Primary | ICD-10-CM

## 2021-01-07 PROCEDURE — 99213 OFFICE O/P EST LOW 20 MIN: CPT | Performed by: INTERNAL MEDICINE

## 2021-01-07 NOTE — PROGRESS NOTES
Patient ID: Marianna Gray is a 39year old female. No chief complaint on file. HISTORY OF PRESENT ILLNESS:   Patient presents for above. This visit is conducted using Telemedicine with live, interactive video and audio.   C/c miranda   C/o fee status:       Spouse name: Not on file      Number of children: Not on file      Years of education: Not on file      Highest education level: Not on file    Occupational History      Not on file    Social Needs      Financial resource strain: Not o CBC WITH DIFFERENTIAL WITH PLATELET; Future  - COMP METABOLIC PANEL (14); Future    2. FREY (dyspnea on exertion)  - XR CHEST PA + LAT CHEST (CPT=25846); Future  - EKG 12-LEAD; Future  - CBC WITH DIFFERENTIAL WITH PLATELET;  Future  - COMP METABOLIC PANEL (1 related consents and the risks discussed. Lastly, the patient [place] Included in this visit, time may have been spent reviewing labs, medications, radiology tests and decision making.  Appropriate medical decision-making

## 2021-01-08 ENCOUNTER — TELEPHONE (OUTPATIENT)
Dept: INTERNAL MEDICINE CLINIC | Facility: CLINIC | Age: 46
End: 2021-01-08

## 2021-01-08 DIAGNOSIS — Z86.16 HISTORY OF 2019 NOVEL CORONAVIRUS DISEASE (COVID-19): Primary | ICD-10-CM

## 2021-01-08 NOTE — TELEPHONE ENCOUNTER
Pt states she had a visit with Dr. Dale Perkins yesterday and had lab work ordered. Pt states she forgot to ask doctor if she would order a Covid antibody test as she tested positive for Covid-19 in November 2020.  Pt states she just wanted to know if she had

## 2021-01-11 ENCOUNTER — LAB ENCOUNTER (OUTPATIENT)
Dept: LAB | Facility: HOSPITAL | Age: 46
End: 2021-01-11
Attending: INTERNAL MEDICINE
Payer: COMMERCIAL

## 2021-01-11 ENCOUNTER — HOSPITAL ENCOUNTER (OUTPATIENT)
Dept: GENERAL RADIOLOGY | Facility: HOSPITAL | Age: 46
Discharge: HOME | End: 2021-01-11
Attending: INTERNAL MEDICINE
Payer: COMMERCIAL

## 2021-01-11 DIAGNOSIS — Z86.16 HISTORY OF 2019 NOVEL CORONAVIRUS DISEASE (COVID-19): ICD-10-CM

## 2021-01-11 DIAGNOSIS — R06.00 DOE (DYSPNEA ON EXERTION): ICD-10-CM

## 2021-01-11 DIAGNOSIS — D64.9 ANEMIA, UNSPECIFIED TYPE: ICD-10-CM

## 2021-01-11 DIAGNOSIS — Z86.16 HISTORY OF COVID-19: ICD-10-CM

## 2021-01-11 LAB
ALBUMIN SERPL-MCNC: 3.6 G/DL (ref 3.4–5)
ALBUMIN/GLOB SERPL: 0.9 {RATIO} (ref 1–2)
ALP LIVER SERPL-CCNC: 54 U/L
ALT SERPL-CCNC: 33 U/L
ANION GAP SERPL CALC-SCNC: 3 MMOL/L (ref 0–18)
AST SERPL-CCNC: 21 U/L (ref 15–37)
BASOPHILS # BLD AUTO: 0.07 X10(3) UL (ref 0–0.2)
BASOPHILS NFR BLD AUTO: 1 %
BILIRUB SERPL-MCNC: 0.2 MG/DL (ref 0.1–2)
BUN BLD-MCNC: 13 MG/DL (ref 7–18)
BUN/CREAT SERPL: 16.9 (ref 10–20)
CALCIUM BLD-MCNC: 9 MG/DL (ref 8.5–10.1)
CHLORIDE SERPL-SCNC: 107 MMOL/L (ref 98–112)
CO2 SERPL-SCNC: 29 MMOL/L (ref 21–32)
CREAT BLD-MCNC: 0.77 MG/DL
DEPRECATED RDW RBC AUTO: 45.7 FL (ref 35.1–46.3)
EOSINOPHIL # BLD AUTO: 0.11 X10(3) UL (ref 0–0.7)
EOSINOPHIL NFR BLD AUTO: 1.5 %
ERYTHROCYTE [DISTWIDTH] IN BLOOD BY AUTOMATED COUNT: 15.3 % (ref 11–15)
GLOBULIN PLAS-MCNC: 3.9 G/DL (ref 2.8–4.4)
GLUCOSE BLD-MCNC: 82 MG/DL (ref 70–99)
HCT VFR BLD AUTO: 32.8 %
HGB BLD-MCNC: 10.3 G/DL
IMM GRANULOCYTES # BLD AUTO: 0.02 X10(3) UL (ref 0–1)
IMM GRANULOCYTES NFR BLD: 0.3 %
LYMPHOCYTES # BLD AUTO: 1.29 X10(3) UL (ref 1–4)
LYMPHOCYTES NFR BLD AUTO: 17.7 %
M PROTEIN MFR SERPL ELPH: 7.5 G/DL (ref 6.4–8.2)
MCH RBC QN AUTO: 25.6 PG (ref 26–34)
MCHC RBC AUTO-ENTMCNC: 31.4 G/DL (ref 31–37)
MCV RBC AUTO: 81.6 FL
MONOCYTES # BLD AUTO: 0.54 X10(3) UL (ref 0.1–1)
MONOCYTES NFR BLD AUTO: 7.4 %
NEUTROPHILS # BLD AUTO: 5.27 X10 (3) UL (ref 1.5–7.7)
NEUTROPHILS # BLD AUTO: 5.27 X10(3) UL (ref 1.5–7.7)
NEUTROPHILS NFR BLD AUTO: 72.1 %
OSMOLALITY SERPL CALC.SUM OF ELEC: 287 MOSM/KG (ref 275–295)
PATIENT FASTING Y/N/NP: YES
PLATELET # BLD AUTO: 488 10(3)UL (ref 150–450)
POTASSIUM SERPL-SCNC: 4 MMOL/L (ref 3.5–5.1)
RBC # BLD AUTO: 4.02 X10(6)UL
SARS-COV-2 IGG+IGM SERPL QL IA: REACTIVE
SODIUM SERPL-SCNC: 139 MMOL/L (ref 136–145)
WBC # BLD AUTO: 7.3 X10(3) UL (ref 4–11)

## 2021-01-11 PROCEDURE — 93005 ELECTROCARDIOGRAM TRACING: CPT

## 2021-01-11 PROCEDURE — 93010 ELECTROCARDIOGRAM REPORT: CPT | Performed by: INTERNAL MEDICINE

## 2021-01-11 PROCEDURE — 86769 SARS-COV-2 COVID-19 ANTIBODY: CPT

## 2021-01-11 PROCEDURE — 36415 COLL VENOUS BLD VENIPUNCTURE: CPT

## 2021-01-11 PROCEDURE — 71046 X-RAY EXAM CHEST 2 VIEWS: CPT | Performed by: INTERNAL MEDICINE

## 2021-01-11 PROCEDURE — 80053 COMPREHEN METABOLIC PANEL: CPT

## 2021-01-11 PROCEDURE — 85025 COMPLETE CBC W/AUTO DIFF WBC: CPT

## 2021-01-22 ENCOUNTER — NURSE TRIAGE (OUTPATIENT)
Dept: INTERNAL MEDICINE CLINIC | Facility: CLINIC | Age: 46
End: 2021-01-22

## 2021-01-22 NOTE — TELEPHONE ENCOUNTER
Action Requested: Summary for Provider     []  Critical Lab, Recommendations Needed  [] Need Additional Advice  []   FYI    []   Need Orders  [] Need Medications Sent to Pharmacy  []  Other     SUMMARY: Pt scheduled appt for tomorrow with ALEKSANDAR Roy

## 2021-01-23 ENCOUNTER — OFFICE VISIT (OUTPATIENT)
Dept: INTERNAL MEDICINE CLINIC | Facility: CLINIC | Age: 46
End: 2021-01-23
Payer: COMMERCIAL

## 2021-01-23 VITALS
SYSTOLIC BLOOD PRESSURE: 121 MMHG | HEART RATE: 83 BPM | WEIGHT: 152 LBS | BODY MASS INDEX: 25.95 KG/M2 | DIASTOLIC BLOOD PRESSURE: 84 MMHG | HEIGHT: 64 IN | RESPIRATION RATE: 16 BRPM

## 2021-01-23 DIAGNOSIS — B00.9 HERPES SIMPLEX: Primary | ICD-10-CM

## 2021-01-23 DIAGNOSIS — Z12.31 ENCOUNTER FOR SCREENING MAMMOGRAM FOR MALIGNANT NEOPLASM OF BREAST: ICD-10-CM

## 2021-01-23 PROCEDURE — 3074F SYST BP LT 130 MM HG: CPT | Performed by: PHYSICIAN ASSISTANT

## 2021-01-23 PROCEDURE — 99213 OFFICE O/P EST LOW 20 MIN: CPT | Performed by: PHYSICIAN ASSISTANT

## 2021-01-23 PROCEDURE — 3008F BODY MASS INDEX DOCD: CPT | Performed by: PHYSICIAN ASSISTANT

## 2021-01-23 PROCEDURE — 3079F DIAST BP 80-89 MM HG: CPT | Performed by: PHYSICIAN ASSISTANT

## 2021-01-23 RX ORDER — VALACYCLOVIR HYDROCHLORIDE 1 G/1
1 TABLET, FILM COATED ORAL 2 TIMES DAILY
Qty: 14 TABLET | Refills: 0 | Status: SHIPPED | OUTPATIENT
Start: 2021-01-23 | End: 2021-01-30

## 2021-01-23 NOTE — PROGRESS NOTES
HPI:    Patient ID: Brii Patel is a 39year old female. HPI  Patient presents for an evaluation of lesions under her lip. Notes yearly this time of year she does get this cluster of lesions on her lip, this time its under.  Notes initially she /84 (BP Location: Right arm, Patient Position: Sitting, Cuff Size: adult)   Pulse 83   Resp 16   Ht 5' 4\" (1.626 m)   Wt 152 lb (68.9 kg)   LMP 08/28/2020   BMI 26.09 kg/m²   Body mass index is 26.09 kg/m².   Physical Exam    Constitutional: She is o

## 2021-03-15 DIAGNOSIS — Z23 NEED FOR VACCINATION: ICD-10-CM

## 2021-05-26 ENCOUNTER — HOSPITAL ENCOUNTER (OUTPATIENT)
Dept: MAMMOGRAPHY | Facility: HOSPITAL | Age: 46
Discharge: HOME | End: 2021-05-26
Attending: PHYSICIAN ASSISTANT
Payer: COMMERCIAL

## 2021-05-26 DIAGNOSIS — Z12.31 ENCOUNTER FOR SCREENING MAMMOGRAM FOR MALIGNANT NEOPLASM OF BREAST: ICD-10-CM

## 2021-05-26 PROCEDURE — 77063 BREAST TOMOSYNTHESIS BI: CPT | Performed by: PHYSICIAN ASSISTANT

## 2021-05-26 PROCEDURE — 77067 SCR MAMMO BI INCL CAD: CPT | Performed by: PHYSICIAN ASSISTANT

## 2021-06-09 ENCOUNTER — PATIENT MESSAGE (OUTPATIENT)
Dept: INTERNAL MEDICINE CLINIC | Facility: CLINIC | Age: 46
End: 2021-06-09

## 2021-06-09 NOTE — TELEPHONE ENCOUNTER
From: Patel Barrett  To: Alayna Poole MD  Sent: 6/9/2021 11:02 AM CDT  Subject: Other    Hi,     Attached herewith is a copy of my COVID Vaccine for your record. Thanks.     Devin Izaguirre

## 2021-06-10 ENCOUNTER — HOSPITAL ENCOUNTER (OUTPATIENT)
Dept: MAMMOGRAPHY | Facility: HOSPITAL | Age: 46
Discharge: HOME | End: 2021-06-10
Attending: PHYSICIAN ASSISTANT
Payer: COMMERCIAL

## 2021-06-10 ENCOUNTER — HOSPITAL ENCOUNTER (OUTPATIENT)
Dept: ULTRASOUND IMAGING | Facility: HOSPITAL | Age: 46
Discharge: HOME | End: 2021-06-10
Attending: PHYSICIAN ASSISTANT
Payer: COMMERCIAL

## 2021-06-10 DIAGNOSIS — R92.8 ABNORMAL MAMMOGRAM: ICD-10-CM

## 2021-06-10 PROCEDURE — 77061 BREAST TOMOSYNTHESIS UNI: CPT | Performed by: PHYSICIAN ASSISTANT

## 2021-06-10 PROCEDURE — 77065 DX MAMMO INCL CAD UNI: CPT | Performed by: PHYSICIAN ASSISTANT

## 2021-06-10 PROCEDURE — 76642 ULTRASOUND BREAST LIMITED: CPT | Performed by: PHYSICIAN ASSISTANT

## 2021-12-13 RX ORDER — EMOLLIENT COMBINATION NO.32
1 EMULSION, EXTENDED RELEASE TOPICAL 2 TIMES DAILY
Qty: 225 G | Refills: 4 | Status: SHIPPED | OUTPATIENT
Start: 2021-12-13

## 2021-12-14 ENCOUNTER — TELEPHONE (OUTPATIENT)
Dept: INTERNAL MEDICINE CLINIC | Facility: CLINIC | Age: 46
End: 2021-12-14

## 2021-12-14 ENCOUNTER — HOSPITAL ENCOUNTER (OUTPATIENT)
Dept: ULTRASOUND IMAGING | Facility: HOSPITAL | Age: 46
Discharge: HOME | End: 2021-12-14
Attending: INTERNAL MEDICINE
Payer: COMMERCIAL

## 2021-12-14 ENCOUNTER — HOSPITAL ENCOUNTER (OUTPATIENT)
Dept: MAMMOGRAPHY | Facility: HOSPITAL | Age: 46
Discharge: HOME | End: 2021-12-14
Attending: INTERNAL MEDICINE
Payer: COMMERCIAL

## 2021-12-14 DIAGNOSIS — N60.01 CYST OF RIGHT BREAST: ICD-10-CM

## 2021-12-14 DIAGNOSIS — R92.8 ABNORMAL MAMMOGRAM: ICD-10-CM

## 2021-12-14 PROCEDURE — 76642 ULTRASOUND BREAST LIMITED: CPT | Performed by: INTERNAL MEDICINE

## 2021-12-14 PROCEDURE — 77061 BREAST TOMOSYNTHESIS UNI: CPT | Performed by: INTERNAL MEDICINE

## 2021-12-14 PROCEDURE — 77065 DX MAMMO INCL CAD UNI: CPT | Performed by: INTERNAL MEDICINE

## 2021-12-14 NOTE — TELEPHONE ENCOUNTER
[email or web address]/login  KEY: [id number]      Current Outpatient Medications:   •  Dermatological Products, Misc. [facility]) External Emulsion, Apply 1 Application topically 2 (two) times daily. , Disp: 225 g, Rfl: 4

## 2021-12-16 NOTE — TELEPHONE ENCOUNTER
Dr. Eliel Mcrae PA was denied. Denial information is being faxed to PCP office. Denied  12/15/2021  9:51 PM  Case ID: [id number] Appeal supported: No   Note from payer: Your PA request has been denied.  Additional information will be provided in the nate

## 2021-12-17 ENCOUNTER — MED REC SCAN ONLY (OUTPATIENT)
Dept: INTERNAL MEDICINE CLINIC | Facility: CLINIC | Age: 46
End: 2021-12-17

## 2021-12-17 NOTE — TELEPHONE ENCOUNTER
Please let patient know that this is denied and she will need to go to the dermatologist to get this refilled–I believe the and initial prescription was given by the specialists

## 2021-12-20 NOTE — TELEPHONE ENCOUNTER
Patient states she did not request the [facility] [place] and was sure why it was being refilled.

## 2021-12-20 NOTE — TELEPHONE ENCOUNTER
Does patient want to follow up with Dermatologist, if so we need a referral.   Left message for patient to call back

## 2021-12-22 DIAGNOSIS — Z12.31 SCREENING MAMMOGRAM, ENCOUNTER FOR: ICD-10-CM

## 2021-12-22 DIAGNOSIS — Z12.5 ENCOUNTER FOR SCREENING FOR MALIGNANT NEOPLASM OF PROSTATE: Primary | ICD-10-CM

## 2022-06-08 ENCOUNTER — OFFICE VISIT (OUTPATIENT)
Dept: INTERNAL MEDICINE CLINIC | Facility: CLINIC | Age: 47
End: 2022-06-08
Payer: COMMERCIAL

## 2022-06-08 VITALS
TEMPERATURE: 97 F | WEIGHT: 151.38 LBS | HEART RATE: 85 BPM | SYSTOLIC BLOOD PRESSURE: 111 MMHG | HEIGHT: 64 IN | DIASTOLIC BLOOD PRESSURE: 67 MMHG | BODY MASS INDEX: 25.84 KG/M2

## 2022-06-08 DIAGNOSIS — L30.9 DERMATITIS: ICD-10-CM

## 2022-06-08 DIAGNOSIS — L29.9 PRURITIC DERMATITIS: ICD-10-CM

## 2022-06-08 DIAGNOSIS — Z00.00 PHYSICAL EXAM, ANNUAL: Primary | ICD-10-CM

## 2022-06-08 DIAGNOSIS — N60.09 CYST OF BREAST, UNSPECIFIED LATERALITY: ICD-10-CM

## 2022-06-08 DIAGNOSIS — G89.29 CHRONIC BILATERAL LOW BACK PAIN WITHOUT SCIATICA: ICD-10-CM

## 2022-06-08 DIAGNOSIS — Z12.11 COLON CANCER SCREENING: ICD-10-CM

## 2022-06-08 DIAGNOSIS — M54.50 CHRONIC BILATERAL LOW BACK PAIN WITHOUT SCIATICA: ICD-10-CM

## 2022-06-08 DIAGNOSIS — Z01.419 ENCOUNTER FOR ROUTINE GYNECOLOGICAL EXAMINATION WITH PAPANICOLAOU SMEAR OF CERVIX: ICD-10-CM

## 2022-06-08 PROCEDURE — 3078F DIAST BP <80 MM HG: CPT | Performed by: INTERNAL MEDICINE

## 2022-06-08 PROCEDURE — 99396 PREV VISIT EST AGE 40-64: CPT | Performed by: INTERNAL MEDICINE

## 2022-06-08 PROCEDURE — 3074F SYST BP LT 130 MM HG: CPT | Performed by: INTERNAL MEDICINE

## 2022-06-08 PROCEDURE — 3008F BODY MASS INDEX DOCD: CPT | Performed by: INTERNAL MEDICINE

## 2022-06-08 RX ORDER — CLOBETASOL PROPIONATE 0.5 MG/G
OINTMENT TOPICAL
Qty: 60 G | Refills: 3 | Status: SHIPPED | OUTPATIENT
Start: 2022-06-08

## 2022-06-08 RX ORDER — MULTIVIT-MIN/IRON FUM/FOLIC AC 7.5 MG-4
1 TABLET ORAL DAILY
COMMUNITY

## 2022-06-10 ENCOUNTER — LAB ENCOUNTER (OUTPATIENT)
Dept: LAB | Facility: HOSPITAL | Age: 47
End: 2022-06-10
Attending: INTERNAL MEDICINE
Payer: COMMERCIAL

## 2022-06-10 ENCOUNTER — HOSPITAL ENCOUNTER (OUTPATIENT)
Dept: GENERAL RADIOLOGY | Facility: HOSPITAL | Age: 47
Discharge: HOME | End: 2022-06-10
Attending: INTERNAL MEDICINE
Payer: COMMERCIAL

## 2022-06-10 DIAGNOSIS — G89.29 CHRONIC BILATERAL LOW BACK PAIN WITHOUT SCIATICA: ICD-10-CM

## 2022-06-10 DIAGNOSIS — Z00.00 PHYSICAL EXAM, ANNUAL: ICD-10-CM

## 2022-06-10 DIAGNOSIS — M54.50 CHRONIC BILATERAL LOW BACK PAIN WITHOUT SCIATICA: ICD-10-CM

## 2022-06-10 LAB
ALBUMIN SERPL-MCNC: 4.2 G/DL (ref 3.4–5)
ALBUMIN/GLOB SERPL: 1.1 {RATIO} (ref 1–2)
ALP LIVER SERPL-CCNC: 46 U/L
ALT SERPL-CCNC: 23 U/L
ANION GAP SERPL CALC-SCNC: 4 MMOL/L (ref 0–18)
AST SERPL-CCNC: 19 U/L (ref 15–37)
BILIRUB SERPL-MCNC: 0.4 MG/DL (ref 0.1–2)
BUN BLD-MCNC: 16 MG/DL (ref 7–18)
BUN/CREAT SERPL: 21.1 (ref 10–20)
CALCIUM BLD-MCNC: 9.4 MG/DL (ref 8.5–10.1)
CHLORIDE SERPL-SCNC: 107 MMOL/L (ref 98–112)
CHOLEST SERPL-MCNC: 196 MG/DL (ref ?–200)
CO2 SERPL-SCNC: 28 MMOL/L (ref 21–32)
CREAT BLD-MCNC: 0.76 MG/DL
DEPRECATED RDW RBC AUTO: 48 FL (ref 35.1–46.3)
ERYTHROCYTE [DISTWIDTH] IN BLOOD BY AUTOMATED COUNT: 17.2 % (ref 11–15)
FASTING PATIENT LIPID ANSWER: YES
FASTING STATUS PATIENT QL REPORTED: YES
GLOBULIN PLAS-MCNC: 3.7 G/DL (ref 2.8–4.4)
GLUCOSE BLD-MCNC: 88 MG/DL (ref 70–99)
HCT VFR BLD AUTO: 33.1 %
HDLC SERPL-MCNC: 75 MG/DL (ref 40–59)
HGB BLD-MCNC: 10.1 G/DL
LDLC SERPL CALC-MCNC: 111 MG/DL (ref ?–100)
MCH RBC QN AUTO: 23.3 PG (ref 26–34)
MCHC RBC AUTO-ENTMCNC: 30.5 G/DL (ref 31–37)
MCV RBC AUTO: 76.3 FL
NONHDLC SERPL-MCNC: 121 MG/DL (ref ?–130)
OSMOLALITY SERPL CALC.SUM OF ELEC: 289 MOSM/KG (ref 275–295)
PLATELET # BLD AUTO: 521 10(3)UL (ref 150–450)
POTASSIUM SERPL-SCNC: 3.7 MMOL/L (ref 3.5–5.1)
PROT SERPL-MCNC: 7.9 G/DL (ref 6.4–8.2)
RBC # BLD AUTO: 4.34 X10(6)UL
SODIUM SERPL-SCNC: 139 MMOL/L (ref 136–145)
TRIGL SERPL-MCNC: 51 MG/DL (ref 30–149)
TSI SER-ACNC: 0.87 MIU/ML (ref 0.36–3.74)
VLDLC SERPL CALC-MCNC: 9 MG/DL (ref 0–30)
WBC # BLD AUTO: 6.3 X10(3) UL (ref 4–11)

## 2022-06-10 PROCEDURE — 36415 COLL VENOUS BLD VENIPUNCTURE: CPT

## 2022-06-10 PROCEDURE — 80061 LIPID PANEL: CPT

## 2022-06-10 PROCEDURE — 72110 X-RAY EXAM L-2 SPINE 4/>VWS: CPT | Performed by: INTERNAL MEDICINE

## 2022-06-10 PROCEDURE — 84443 ASSAY THYROID STIM HORMONE: CPT

## 2022-06-10 PROCEDURE — 85027 COMPLETE CBC AUTOMATED: CPT

## 2022-06-10 PROCEDURE — 80053 COMPREHEN METABOLIC PANEL: CPT

## 2022-06-14 DIAGNOSIS — D64.9 ANEMIA, UNSPECIFIED TYPE: Primary | ICD-10-CM

## 2022-06-14 DIAGNOSIS — D50.8 IRON DEFICIENCY ANEMIA SECONDARY TO INADEQUATE DIETARY IRON INTAKE: Primary | ICD-10-CM

## 2022-06-14 LAB
DEPRECATED HBV CORE AB SER IA-ACNC: 3.2 NG/ML
IRON SATN MFR SERPL: 7 %
IRON SERPL-MCNC: 34 UG/DL
TIBC SERPL-MCNC: 510 UG/DL (ref 240–450)
TRANSFERRIN SERPL-MCNC: 342 MG/DL (ref 200–360)

## 2022-08-23 ENCOUNTER — TELEPHONE (OUTPATIENT)
Dept: INTERNAL MEDICINE CLINIC | Facility: CLINIC | Age: 47
End: 2022-08-23

## 2022-08-23 DIAGNOSIS — R92.8 ABNORMAL MAMMOGRAM: Primary | ICD-10-CM

## 2022-08-23 NOTE — TELEPHONE ENCOUNTER
Per the mammography department, patient's diagnostic mammogram has a diagnosis code associated with a screening mammogram. Please update diagnosis code.  Patient's appointment is scheduled for 8/25

## 2022-09-01 ENCOUNTER — HOSPITAL ENCOUNTER (OUTPATIENT)
Dept: MAMMOGRAPHY | Facility: HOSPITAL | Age: 47
Discharge: HOME | End: 2022-09-01
Attending: INTERNAL MEDICINE
Payer: COMMERCIAL

## 2022-09-01 ENCOUNTER — HOSPITAL ENCOUNTER (OUTPATIENT)
Dept: ULTRASOUND IMAGING | Facility: HOSPITAL | Age: 47
Discharge: HOME | End: 2022-09-01
Attending: INTERNAL MEDICINE
Payer: COMMERCIAL

## 2022-09-01 DIAGNOSIS — R92.8 ABNORMAL MAMMOGRAM: ICD-10-CM

## 2022-09-01 PROCEDURE — 76642 ULTRASOUND BREAST LIMITED: CPT | Performed by: INTERNAL MEDICINE

## 2022-09-01 PROCEDURE — 77066 DX MAMMO INCL CAD BI: CPT | Performed by: INTERNAL MEDICINE

## 2022-09-01 PROCEDURE — 77062 BREAST TOMOSYNTHESIS BI: CPT | Performed by: INTERNAL MEDICINE

## 2023-04-12 ENCOUNTER — OFFICE VISIT (OUTPATIENT)
Dept: INTERNAL MEDICINE CLINIC | Facility: CLINIC | Age: 48
End: 2023-04-12

## 2023-04-12 VITALS
BODY MASS INDEX: 25.16 KG/M2 | TEMPERATURE: 99 F | DIASTOLIC BLOOD PRESSURE: 60 MMHG | HEIGHT: 64 IN | RESPIRATION RATE: 20 BRPM | SYSTOLIC BLOOD PRESSURE: 106 MMHG | OXYGEN SATURATION: 100 % | WEIGHT: 147.38 LBS | HEART RATE: 90 BPM

## 2023-04-12 DIAGNOSIS — R19.7 DIARRHEA OF PRESUMED INFECTIOUS ORIGIN: Primary | ICD-10-CM

## 2023-04-12 PROCEDURE — 99213 OFFICE O/P EST LOW 20 MIN: CPT | Performed by: INTERNAL MEDICINE

## 2023-04-12 PROCEDURE — 3078F DIAST BP <80 MM HG: CPT | Performed by: INTERNAL MEDICINE

## 2023-04-12 PROCEDURE — 3008F BODY MASS INDEX DOCD: CPT | Performed by: INTERNAL MEDICINE

## 2023-04-12 PROCEDURE — 3074F SYST BP LT 130 MM HG: CPT | Performed by: INTERNAL MEDICINE

## 2023-04-13 ENCOUNTER — PATIENT MESSAGE (OUTPATIENT)
Dept: INTERNAL MEDICINE CLINIC | Facility: CLINIC | Age: 48
End: 2023-04-13

## 2023-04-13 ENCOUNTER — LAB ENCOUNTER (OUTPATIENT)
Dept: LAB | Age: 48
End: 2023-04-13
Attending: INTERNAL MEDICINE
Payer: COMMERCIAL

## 2023-04-13 DIAGNOSIS — D64.9 ANEMIA, UNSPECIFIED TYPE: ICD-10-CM

## 2023-04-13 DIAGNOSIS — R19.7 DIARRHEA OF PRESUMED INFECTIOUS ORIGIN: ICD-10-CM

## 2023-04-13 DIAGNOSIS — Z12.11 COLON CANCER SCREENING: Primary | ICD-10-CM

## 2023-04-13 LAB
ALBUMIN SERPL-MCNC: 3.7 G/DL (ref 3.4–5)
ALBUMIN/GLOB SERPL: 1 {RATIO} (ref 1–2)
ALP LIVER SERPL-CCNC: 49 U/L
ALT SERPL-CCNC: 22 U/L
ANION GAP SERPL CALC-SCNC: 6 MMOL/L (ref 0–18)
AST SERPL-CCNC: 13 U/L (ref 15–37)
BILIRUB SERPL-MCNC: 0.3 MG/DL (ref 0.1–2)
BUN BLD-MCNC: 7 MG/DL (ref 7–18)
BUN/CREAT SERPL: 10.4 (ref 10–20)
CALCIUM BLD-MCNC: 9.3 MG/DL (ref 8.5–10.1)
CHLORIDE SERPL-SCNC: 112 MMOL/L (ref 98–112)
CO2 SERPL-SCNC: 24 MMOL/L (ref 21–32)
CREAT BLD-MCNC: 0.67 MG/DL
CRYPTOSP AG STL QL IA: NEGATIVE
DEPRECATED RDW RBC AUTO: 44.2 FL (ref 35.1–46.3)
ERYTHROCYTE [DISTWIDTH] IN BLOOD BY AUTOMATED COUNT: 16.1 % (ref 11–15)
FASTING STATUS PATIENT QL REPORTED: YES
G LAMBLIA AG STL QL IA: NEGATIVE
GFR SERPLBLD BASED ON 1.73 SQ M-ARVRAT: 110 ML/MIN/1.73M2 (ref 60–?)
GLOBULIN PLAS-MCNC: 3.6 G/DL (ref 2.8–4.4)
GLUCOSE BLD-MCNC: 94 MG/DL (ref 70–99)
HCT VFR BLD AUTO: 30.6 %
HGB BLD-MCNC: 9.3 G/DL
MCH RBC QN AUTO: 23 PG (ref 26–34)
MCHC RBC AUTO-ENTMCNC: 30.4 G/DL (ref 31–37)
MCV RBC AUTO: 75.6 FL
OSMOLALITY SERPL CALC.SUM OF ELEC: 292 MOSM/KG (ref 275–295)
PLATELET # BLD AUTO: 474 10(3)UL (ref 150–450)
POTASSIUM SERPL-SCNC: 4.5 MMOL/L (ref 3.5–5.1)
PROT SERPL-MCNC: 7.3 G/DL (ref 6.4–8.2)
RBC # BLD AUTO: 4.05 X10(6)UL
SODIUM SERPL-SCNC: 142 MMOL/L (ref 136–145)
WBC # BLD AUTO: 5.5 X10(3) UL (ref 4–11)

## 2023-04-13 PROCEDURE — 87272 CRYPTOSPORIDIUM AG IF: CPT

## 2023-04-13 PROCEDURE — 82728 ASSAY OF FERRITIN: CPT

## 2023-04-13 PROCEDURE — 85027 COMPLETE CBC AUTOMATED: CPT

## 2023-04-13 PROCEDURE — 87046 STOOL CULTR AEROBIC BACT EA: CPT

## 2023-04-13 PROCEDURE — 87329 GIARDIA AG IA: CPT

## 2023-04-13 PROCEDURE — 87045 FECES CULTURE AEROBIC BACT: CPT

## 2023-04-13 PROCEDURE — 87077 CULTURE AEROBIC IDENTIFY: CPT

## 2023-04-13 PROCEDURE — 83540 ASSAY OF IRON: CPT

## 2023-04-13 PROCEDURE — 36415 COLL VENOUS BLD VENIPUNCTURE: CPT

## 2023-04-13 PROCEDURE — 89055 LEUKOCYTE ASSESSMENT FECAL: CPT

## 2023-04-13 PROCEDURE — 87338 HPYLORI STOOL AG IA: CPT

## 2023-04-13 PROCEDURE — 87427 SHIGA-LIKE TOXIN AG IA: CPT

## 2023-04-13 PROCEDURE — 84466 ASSAY OF TRANSFERRIN: CPT

## 2023-04-13 PROCEDURE — 80053 COMPREHEN METABOLIC PANEL: CPT

## 2023-04-15 DIAGNOSIS — D64.9 ANEMIA, UNSPECIFIED TYPE: Primary | ICD-10-CM

## 2023-04-15 LAB
DEPRECATED HBV CORE AB SER IA-ACNC: 3.8 NG/ML
IRON SATN MFR SERPL: 3 %
IRON SERPL-MCNC: 16 UG/DL
TIBC SERPL-MCNC: 469 UG/DL (ref 240–450)
TRANSFERRIN SERPL-MCNC: 315 MG/DL (ref 200–360)

## 2023-04-17 LAB — H PYLORI AG STL QL IA: NEGATIVE

## 2023-06-20 ENCOUNTER — OFFICE VISIT (OUTPATIENT)
Dept: INTERNAL MEDICINE CLINIC | Facility: CLINIC | Age: 48
End: 2023-06-20

## 2023-06-20 ENCOUNTER — LAB ENCOUNTER (OUTPATIENT)
Dept: LAB | Age: 48
End: 2023-06-20
Attending: INTERNAL MEDICINE
Payer: COMMERCIAL

## 2023-06-20 VITALS
HEIGHT: 64 IN | SYSTOLIC BLOOD PRESSURE: 112 MMHG | HEART RATE: 73 BPM | WEIGHT: 149.38 LBS | DIASTOLIC BLOOD PRESSURE: 66 MMHG | OXYGEN SATURATION: 100 % | RESPIRATION RATE: 18 BRPM | BODY MASS INDEX: 25.5 KG/M2

## 2023-06-20 DIAGNOSIS — Z00.00 PHYSICAL EXAM, ANNUAL: ICD-10-CM

## 2023-06-20 DIAGNOSIS — D50.9 IRON DEFICIENCY ANEMIA, UNSPECIFIED IRON DEFICIENCY ANEMIA TYPE: ICD-10-CM

## 2023-06-20 DIAGNOSIS — Z00.00 PHYSICAL EXAM, ANNUAL: Primary | ICD-10-CM

## 2023-06-20 DIAGNOSIS — E55.9 VITAMIN D DEFICIENCY: ICD-10-CM

## 2023-06-20 DIAGNOSIS — Z01.419 ENCOUNTER FOR ROUTINE GYNECOLOGICAL EXAMINATION WITH PAPANICOLAOU SMEAR OF CERVIX: ICD-10-CM

## 2023-06-20 DIAGNOSIS — Z12.11 COLON CANCER SCREENING: ICD-10-CM

## 2023-06-20 DIAGNOSIS — Z12.31 ENCOUNTER FOR SCREENING MAMMOGRAM FOR MALIGNANT NEOPLASM OF BREAST: ICD-10-CM

## 2023-06-20 LAB
ALBUMIN SERPL-MCNC: 3.6 G/DL (ref 3.4–5)
ALBUMIN/GLOB SERPL: 1 {RATIO} (ref 1–2)
ALP LIVER SERPL-CCNC: 53 U/L
ALT SERPL-CCNC: 43 U/L
ANION GAP SERPL CALC-SCNC: 6 MMOL/L (ref 0–18)
AST SERPL-CCNC: 24 U/L (ref 15–37)
BASOPHILS # BLD AUTO: 0.05 X10(3) UL (ref 0–0.2)
BASOPHILS NFR BLD AUTO: 0.8 %
BILIRUB SERPL-MCNC: 0.2 MG/DL (ref 0.1–2)
BUN BLD-MCNC: 15 MG/DL (ref 7–18)
BUN/CREAT SERPL: 20.5 (ref 10–20)
CALCIUM BLD-MCNC: 9 MG/DL (ref 8.5–10.1)
CHLORIDE SERPL-SCNC: 111 MMOL/L (ref 98–112)
CHOLEST SERPL-MCNC: 191 MG/DL (ref ?–200)
CO2 SERPL-SCNC: 25 MMOL/L (ref 21–32)
CREAT BLD-MCNC: 0.73 MG/DL
DEPRECATED HBV CORE AB SER IA-ACNC: 3.9 NG/ML
DEPRECATED RDW RBC AUTO: 46.9 FL (ref 35.1–46.3)
EOSINOPHIL # BLD AUTO: 0.2 X10(3) UL (ref 0–0.7)
EOSINOPHIL NFR BLD AUTO: 3 %
ERYTHROCYTE [DISTWIDTH] IN BLOOD BY AUTOMATED COUNT: 18 % (ref 11–15)
FASTING PATIENT LIPID ANSWER: YES
FASTING STATUS PATIENT QL REPORTED: YES
GFR SERPLBLD BASED ON 1.73 SQ M-ARVRAT: 103 ML/MIN/1.73M2 (ref 60–?)
GLOBULIN PLAS-MCNC: 3.6 G/DL (ref 2.8–4.4)
GLUCOSE BLD-MCNC: 90 MG/DL (ref 70–99)
HCT VFR BLD AUTO: 29.2 %
HDLC SERPL-MCNC: 74 MG/DL (ref 40–59)
HGB BLD-MCNC: 8.9 G/DL
IMM GRANULOCYTES # BLD AUTO: 0.02 X10(3) UL (ref 0–1)
IMM GRANULOCYTES NFR BLD: 0.3 %
IRON SATN MFR SERPL: 3 %
IRON SERPL-MCNC: 14 UG/DL
LDLC SERPL CALC-MCNC: 109 MG/DL (ref ?–100)
LYMPHOCYTES # BLD AUTO: 1.86 X10(3) UL (ref 1–4)
LYMPHOCYTES NFR BLD AUTO: 27.9 %
MCH RBC QN AUTO: 22 PG (ref 26–34)
MCHC RBC AUTO-ENTMCNC: 30.5 G/DL (ref 31–37)
MCV RBC AUTO: 72.3 FL
MONOCYTES # BLD AUTO: 0.64 X10(3) UL (ref 0.1–1)
MONOCYTES NFR BLD AUTO: 9.6 %
NEUTROPHILS # BLD AUTO: 3.89 X10 (3) UL (ref 1.5–7.7)
NEUTROPHILS # BLD AUTO: 3.89 X10(3) UL (ref 1.5–7.7)
NEUTROPHILS NFR BLD AUTO: 58.4 %
NONHDLC SERPL-MCNC: 117 MG/DL (ref ?–130)
OSMOLALITY SERPL CALC.SUM OF ELEC: 294 MOSM/KG (ref 275–295)
PLATELET # BLD AUTO: 558 10(3)UL (ref 150–450)
POTASSIUM SERPL-SCNC: 3.9 MMOL/L (ref 3.5–5.1)
PROT SERPL-MCNC: 7.2 G/DL (ref 6.4–8.2)
RBC # BLD AUTO: 4.04 X10(6)UL
SODIUM SERPL-SCNC: 142 MMOL/L (ref 136–145)
TIBC SERPL-MCNC: 495 UG/DL (ref 240–450)
TRANSFERRIN SERPL-MCNC: 332 MG/DL (ref 200–360)
TRIGL SERPL-MCNC: 43 MG/DL (ref 30–149)
TSI SER-ACNC: 2.67 MIU/ML (ref 0.36–3.74)
VIT D+METAB SERPL-MCNC: 21.2 NG/ML (ref 30–100)
VLDLC SERPL CALC-MCNC: 7 MG/DL (ref 0–30)
WBC # BLD AUTO: 6.7 X10(3) UL (ref 4–11)

## 2023-06-20 PROCEDURE — 3008F BODY MASS INDEX DOCD: CPT | Performed by: INTERNAL MEDICINE

## 2023-06-20 PROCEDURE — 99396 PREV VISIT EST AGE 40-64: CPT | Performed by: INTERNAL MEDICINE

## 2023-06-20 PROCEDURE — 84443 ASSAY THYROID STIM HORMONE: CPT

## 2023-06-20 PROCEDURE — 3074F SYST BP LT 130 MM HG: CPT | Performed by: INTERNAL MEDICINE

## 2023-06-20 PROCEDURE — 83540 ASSAY OF IRON: CPT

## 2023-06-20 PROCEDURE — 82306 VITAMIN D 25 HYDROXY: CPT

## 2023-06-20 PROCEDURE — 84466 ASSAY OF TRANSFERRIN: CPT

## 2023-06-20 PROCEDURE — 36415 COLL VENOUS BLD VENIPUNCTURE: CPT

## 2023-06-20 PROCEDURE — 3078F DIAST BP <80 MM HG: CPT | Performed by: INTERNAL MEDICINE

## 2023-06-20 PROCEDURE — 82728 ASSAY OF FERRITIN: CPT

## 2023-06-20 PROCEDURE — 80053 COMPREHEN METABOLIC PANEL: CPT

## 2023-06-20 PROCEDURE — 80061 LIPID PANEL: CPT

## 2023-06-20 PROCEDURE — 85025 COMPLETE CBC W/AUTO DIFF WBC: CPT

## 2023-06-21 ENCOUNTER — MED REC SCAN ONLY (OUTPATIENT)
Dept: INTERNAL MEDICINE CLINIC | Facility: CLINIC | Age: 48
End: 2023-06-21

## 2023-06-29 LAB — HPV I/H RISK 1 DNA SPEC QL NAA+PROBE: NEGATIVE

## 2023-07-03 ENCOUNTER — TELEPHONE (OUTPATIENT)
Dept: INTERNAL MEDICINE CLINIC | Facility: CLINIC | Age: 48
End: 2023-07-03

## 2023-07-03 DIAGNOSIS — R92.8 ABNORMAL MAMMOGRAM: Primary | ICD-10-CM

## 2023-09-01 ENCOUNTER — OFFICE VISIT (OUTPATIENT)
Dept: HEMATOLOGY/ONCOLOGY | Facility: HOSPITAL | Age: 48
End: 2023-09-01
Attending: INTERNAL MEDICINE
Payer: COMMERCIAL

## 2023-09-01 ENCOUNTER — HOSPITAL ENCOUNTER (OUTPATIENT)
Dept: ULTRASOUND IMAGING | Facility: HOSPITAL | Age: 48
Discharge: HOME | End: 2023-09-01
Attending: INTERNAL MEDICINE
Payer: COMMERCIAL

## 2023-09-01 ENCOUNTER — LAB ENCOUNTER (OUTPATIENT)
Dept: LAB | Facility: HOSPITAL | Age: 48
End: 2023-09-01
Attending: INTERNAL MEDICINE
Payer: COMMERCIAL

## 2023-09-01 ENCOUNTER — HOSPITAL ENCOUNTER (OUTPATIENT)
Dept: MAMMOGRAPHY | Facility: HOSPITAL | Age: 48
Discharge: HOME | End: 2023-09-01
Attending: INTERNAL MEDICINE
Payer: COMMERCIAL

## 2023-09-01 VITALS
WEIGHT: 155 LBS | TEMPERATURE: 99 F | HEIGHT: 64 IN | HEART RATE: 75 BPM | OXYGEN SATURATION: 98 % | BODY MASS INDEX: 26.46 KG/M2 | RESPIRATION RATE: 16 BRPM | DIASTOLIC BLOOD PRESSURE: 78 MMHG | SYSTOLIC BLOOD PRESSURE: 130 MMHG

## 2023-09-01 DIAGNOSIS — D75.839 THROMBOCYTOSIS: ICD-10-CM

## 2023-09-01 DIAGNOSIS — N92.0 MENORRHAGIA WITH REGULAR CYCLE: ICD-10-CM

## 2023-09-01 DIAGNOSIS — R92.8 ABNORMAL MAMMOGRAM: ICD-10-CM

## 2023-09-01 DIAGNOSIS — D50.8 OTHER IRON DEFICIENCY ANEMIA: ICD-10-CM

## 2023-09-01 DIAGNOSIS — D50.8 OTHER IRON DEFICIENCY ANEMIA: Primary | ICD-10-CM

## 2023-09-01 LAB
BASOPHILS # BLD AUTO: 0.05 X10(3) UL (ref 0–0.2)
BASOPHILS NFR BLD AUTO: 0.8 %
DEPRECATED HBV CORE AB SER IA-ACNC: 3.4 NG/ML
DEPRECATED RDW RBC AUTO: 48.3 FL (ref 35.1–46.3)
EOSINOPHIL # BLD AUTO: 0.05 X10(3) UL (ref 0–0.7)
EOSINOPHIL NFR BLD AUTO: 0.8 %
ERYTHROCYTE [DISTWIDTH] IN BLOOD BY AUTOMATED COUNT: 18.3 % (ref 11–15)
FOLATE SERPL-MCNC: 13.4 NG/ML (ref 8.7–?)
HAPTOGLOB SERPL-MCNC: 73.7 MG/DL (ref 30–200)
HCT VFR BLD AUTO: 31.8 %
HGB BLD-MCNC: 9.8 G/DL
HGB RETIC QN AUTO: 25.9 PG (ref 28.2–36.6)
IMM GRANULOCYTES # BLD AUTO: 0.01 X10(3) UL (ref 0–1)
IMM GRANULOCYTES NFR BLD: 0.2 %
IMM RETICS NFR: 0.27 RATIO (ref 0.1–0.3)
IRON SATN MFR SERPL: 3 %
IRON SERPL-MCNC: 16 UG/DL
LDH SERPL L TO P-CCNC: 156 U/L
LYMPHOCYTES # BLD AUTO: 1.27 X10(3) UL (ref 1–4)
LYMPHOCYTES NFR BLD AUTO: 20.7 %
MCH RBC QN AUTO: 22.6 PG (ref 26–34)
MCHC RBC AUTO-ENTMCNC: 30.8 G/DL (ref 31–37)
MCV RBC AUTO: 73.3 FL
MONOCYTES # BLD AUTO: 0.47 X10(3) UL (ref 0.1–1)
MONOCYTES NFR BLD AUTO: 7.6 %
NEUTROPHILS # BLD AUTO: 4.3 X10 (3) UL (ref 1.5–7.7)
NEUTROPHILS # BLD AUTO: 4.3 X10(3) UL (ref 1.5–7.7)
NEUTROPHILS NFR BLD AUTO: 69.9 %
PLATELET # BLD AUTO: 528 10(3)UL (ref 150–450)
RBC # BLD AUTO: 4.34 X10(6)UL
RETICS # AUTO: 77.7 X10(3) UL (ref 22.5–147.5)
RETICS/RBC NFR AUTO: 1.8 %
TIBC SERPL-MCNC: 547 UG/DL (ref 240–450)
TRANSFERRIN SERPL-MCNC: 367 MG/DL (ref 200–360)
VIT B12 SERPL-MCNC: 536 PG/ML (ref 193–986)
WBC # BLD AUTO: 6.2 X10(3) UL (ref 4–11)

## 2023-09-01 PROCEDURE — 83540 ASSAY OF IRON: CPT

## 2023-09-01 PROCEDURE — 82746 ASSAY OF FOLIC ACID SERUM: CPT

## 2023-09-01 PROCEDURE — 99211 OFF/OP EST MAY X REQ PHY/QHP: CPT

## 2023-09-01 PROCEDURE — 76642 ULTRASOUND BREAST LIMITED: CPT | Performed by: INTERNAL MEDICINE

## 2023-09-01 PROCEDURE — 85045 AUTOMATED RETICULOCYTE COUNT: CPT

## 2023-09-01 PROCEDURE — 77062 BREAST TOMOSYNTHESIS BI: CPT | Performed by: INTERNAL MEDICINE

## 2023-09-01 PROCEDURE — 83615 LACTATE (LD) (LDH) ENZYME: CPT

## 2023-09-01 PROCEDURE — 84466 ASSAY OF TRANSFERRIN: CPT

## 2023-09-01 PROCEDURE — 85025 COMPLETE CBC W/AUTO DIFF WBC: CPT

## 2023-09-01 PROCEDURE — 83010 ASSAY OF HAPTOGLOBIN QUANT: CPT

## 2023-09-01 PROCEDURE — 82607 VITAMIN B-12: CPT

## 2023-09-01 PROCEDURE — 82728 ASSAY OF FERRITIN: CPT

## 2023-09-01 PROCEDURE — 36415 COLL VENOUS BLD VENIPUNCTURE: CPT

## 2023-09-01 PROCEDURE — 77066 DX MAMMO INCL CAD BI: CPT | Performed by: INTERNAL MEDICINE

## 2023-09-01 RX ORDER — FERROUS SULFATE 325(65) MG
325 TABLET ORAL
Qty: 90 TABLET | Refills: 1 | Status: SHIPPED | OUTPATIENT
Start: 2023-09-01

## 2023-12-01 ENCOUNTER — APPOINTMENT (OUTPATIENT)
Dept: HEMATOLOGY/ONCOLOGY | Facility: HOSPITAL | Age: 48
End: 2023-12-01
Attending: INTERNAL MEDICINE
Payer: COMMERCIAL

## 2024-02-28 DIAGNOSIS — L30.8 PRURITIC DERMATITIS: ICD-10-CM

## 2024-02-28 DIAGNOSIS — L30.9 DERMATITIS: ICD-10-CM

## 2024-02-28 NOTE — TELEPHONE ENCOUNTER
Please review; protocol failed.    Requested Prescriptions   Pending Prescriptions Disp Refills    clobetasol 0.05 % External Ointment 60 g 3     Sig: Apply to affected areas on hands daily as needed.       There is no refill protocol information for this order              Recent Outpatient Visits              6 months ago Other iron deficiency anemia    [facility] Hematology Oncology Gregg Gloria MD    Office Visit    8 months ago Physical exam, annual    [facility] Monique [facility]    Office Visit    10 months ago Diarrhea of presumed infectious origin    [facility]    [facility] Visit    1 year ago [facility]    [facility]    3 years ago Herpes simplex    [facility] Mala Anne PA-C    Office Visit

## 2024-02-28 NOTE — TELEPHONE ENCOUNTER
Current Outpatient Medications:       clobetasol 0.05 % External Ointment, Apply to affected areas on hands daily as needed., Disp: 60 g, Rfl: 3

## 2024-02-29 RX ORDER — CLOBETASOL PROPIONATE 0.5 MG/G
OINTMENT TOPICAL
Qty: 60 G | Refills: 3 | Status: SHIPPED | OUTPATIENT
Start: 2024-02-29

## 2024-07-25 ENCOUNTER — HOSPITAL ENCOUNTER (OUTPATIENT)
Age: 49
Discharge: HOME | End: 2024-07-25
Payer: COMMERCIAL

## 2024-07-25 ENCOUNTER — PATIENT MESSAGE (OUTPATIENT)
Dept: INTERNAL MEDICINE CLINIC | Facility: CLINIC | Age: 49
End: 2024-07-25

## 2024-07-25 ENCOUNTER — APPOINTMENT (OUTPATIENT)
Dept: GENERAL RADIOLOGY | Age: 49
End: 2024-07-25
Attending: Physician Assistant
Payer: COMMERCIAL

## 2024-07-25 ENCOUNTER — NURSE TRIAGE (OUTPATIENT)
Dept: INTERNAL MEDICINE CLINIC | Facility: CLINIC | Age: 49
End: 2024-07-25

## 2024-07-25 VITALS
OXYGEN SATURATION: 100 % | TEMPERATURE: 99 F | HEART RATE: 85 BPM | DIASTOLIC BLOOD PRESSURE: 65 MMHG | SYSTOLIC BLOOD PRESSURE: 139 MMHG | RESPIRATION RATE: 18 BRPM

## 2024-07-25 DIAGNOSIS — J06.9 VIRAL UPPER RESPIRATORY ILLNESS: Primary | ICD-10-CM

## 2024-07-25 LAB
B-HCG UR QL: NEGATIVE
SARS-COV-2 RNA RESP QL NAA+PROBE: NOT DETECTED

## 2024-07-25 PROCEDURE — U0002 COVID-19 LAB TEST NON-CDC: HCPCS | Performed by: PHYSICIAN ASSISTANT

## 2024-07-25 PROCEDURE — 99203 OFFICE O/P NEW LOW 30 MIN: CPT | Performed by: PHYSICIAN ASSISTANT

## 2024-07-25 PROCEDURE — 81025 URINE PREGNANCY TEST: CPT | Performed by: PHYSICIAN ASSISTANT

## 2024-07-25 PROCEDURE — 71046 X-RAY EXAM CHEST 2 VIEWS: CPT | Performed by: PHYSICIAN ASSISTANT

## 2024-07-25 RX ORDER — BENZONATATE 200 MG/1
200 CAPSULE ORAL 3 TIMES DAILY PRN
Qty: 20 CAPSULE | Refills: 0 | Status: SHIPPED | OUTPATIENT
Start: 2024-07-25

## 2024-07-25 NOTE — ED INITIAL ASSESSMENT (HPI)
Pt presents with cough x 4 days. Pt reports flying home from [facility] on 7/19/24.     No fever, no chills. No others in party are sick.     Pt requesting Covid Testing.

## 2024-07-25 NOTE — TELEPHONE ENCOUNTER
Action Requested: Summary for Provider     []  Critical Lab, Recommendations Needed  [] Need Additional Advice  []   FYI    []   Need Orders  [] Need Medications Sent to Pharmacy  []  Other     SUMMARY: Patient calling with cough, sore throat and congestion. Doing home covid test currently. Patient was on a trip, returned Monday and started with symptoms.   No visits in clinic today, advised  or walk in clinic for eval. Patient has cough that is waking her up a lot at night. Could present all day, just worse at night.   Patient would like to be seen today.   She is agreeable to nj IC today  No fever SOB or CP. ABD and chest Muscles hurt from coughing so much.  advised to mask upon arrival at  location.     Not taking any OTC's at this time but we reviewed at home care options and advice. She states understanding.       Reason for call: Cough  Onset: Monday post travel      Reason for Disposition   Continuous (nonstop) coughing interferes with work or school and no improvement using cough treatment per Care Advice    Protocols used: Cough-A-OH

## 2024-07-25 NOTE — TELEPHONE ENCOUNTER
[employer] message with recommendation sent to patient.  Last office visit 6-20-23 for physical.     Future Appointments   Date Time Provider Department Center   9/26/2024 11:20 AM Monique Nicolas MD [facility]

## 2024-07-25 NOTE — ED PROVIDER NOTES
Chief Complaint   Patient presents with    Cough       History obtained from: patient   services not used    HPI:     Vinod Abbasi is a 49 year old female who presents with cough x 4 days. Patient also endorses nasal congestion x 2 days. Patient states symptoms started after returning from trip to [facility]. Patient states cough is mostly dry but intermittently productive of phlegm. Denies fever, chills, chest pain, shortness of breath, wheezing, sore throat, abdominal pain, nausea, vomiting, diarrhea, rash.     PMH  Past Medical History:    Menorrhagia with regular cycle       PFSH    PFSH asessment screens reviewed and agree.  Nurses notes reviewed I agree with documentation.    Family History   Problem Relation Age of Onset    Hypertension Mother     Cancer Maternal Grandfather         lung cancer; tobacco user    Breast Cancer Neg     Ovarian Cancer Neg     Bleeding Disorders Neg     DVT/VTE Neg      Family history reviewed with patient/caregiver and is not pertinent to presenting problem.  Social History     Socioeconomic History    Marital status:      Spouse name: Not on file    Number of children: Not on file    Years of education: Not on file    Highest education level: Not on file   Occupational History    Not on file   Tobacco Use    Smoking status: Never    Smokeless tobacco: Never   Vaping Use    Vaping status: Never Used   Substance and Sexual Activity    Alcohol use: Yes     Alcohol/week: 1.0 standard drink of alcohol     Types: 1 Glasses of wine per week     Comment: Occasionally    Drug use: Never    Sexual activity: Not on file   Other Topics Concern    Grew up on a farm Not Asked    History of tanning Not Asked    Outdoor occupation Not Asked    Breast feeding Not Asked    Reaction to local anesthetic No   Social History Narrative    Vinod is  x 19 yrs. She has 2 children. Patient works as a nurse at [employer] She lives with her family in [facility] [place] [facility]  Determinants of Health     Financial Resource Strain: Not on file   Food Insecurity: Not on file   Transportation Needs: Not on file   Physical Activity: Not on file   Stress: Not on file   Social Connections: Not on file   Housing Stability: Not on file         ROS:   Positive for stated complaint: cough, nasal congestion   All other systems reviewed and negative except as noted above.  Constitutional and Vital Signs Reviewed.    Physical Exam:     Findings:    /65   Pulse 85   Temp 98.5 °F (36.9 °C) (Temporal)   Resp 18   LMP 06/27/2024 (Approximate)   SpO2 100%   GENERAL: well developed, no acute distress, non-toxic appearing   SKIN: good skin turgor, no obvious rashes  HEAD: normocephalic, atraumatic  EYES: sclera non-icteric bilaterally, conjunctiva clear bilaterally  EARS: canals clear bilaterally, TMs clear bilaterally  NOSE: nasal congestion   OROPHARYNX: MMM, pharynx clear, no exudates or swelling, uvula midline, no tongue elevation, maintaining airway and secretions  NECK: supple, no lymphadenopathy, no nuchal rigidity, no trismus, no edema, phonation normal    CARDIO: RRR, normal heart sounds   LUNGS: clear to auscultation bilaterally, no increased WOB, no rales, rhonchi, or wheezes, occasional coughing    EXTREMITIES: no cyanosis or edema, COOLEY without difficulty  NEURO: no focal deficits  PSYCH: alert and oriented x3, answering questions appropriately, mood appropriate    MDM/Assessment/Plan:   Orders for this encounter:    Orders Placed This Encounter    XR CHEST PA + LAT CHEST (CPT=71046)     Cough Pt presents with cough x 4 days. Pt reports flying home from [facility] on   7/19/24.     No fever, no chills. No others in party are sick.     Pt requesting Covid Testing.        Order Specific Question:   What is the Relevant Clinical Indication / Reason for Exam?     Answer:   Cough     Order Specific Question:   Release to patient     Answer:   Immediate    POCT Pregnancy, Urine    Rapid SARS-CoV-2  by PCR     Order Specific Question:   Release to patient     Answer:   Immediate    POCT Pregnancy, Urine    benzonatate 200 MG Oral Cap     Sig: Take 1 capsule (200 mg total) by mouth 3 (three) times daily as needed for cough.     Dispense:  20 capsule     Refill:  0       Labs performed this visit:  Recent Results (from the past 10 hour(s))   Rapid SARS-CoV-2 by PCR    Collection Time: 07/25/24 10:44 AM    Specimen: Nares; Other   Result Value Ref Range    Rapid SARS-CoV-2 by PCR Not Detected Not Detected   POCT Pregnancy, Urine    Collection Time: 07/25/24 11:33 AM   Result Value Ref Range    POCT Urine Pregnancy Negative Negative       Imaging performed this visit:  XR CHEST PA + LAT CHEST (CPT=71046)   Final Result   PROCEDURE: XR CHEST PA + LAT CHEST (CPT=71046)       COMPARISON: [facility], XR CHEST PA + LAT CHEST    (CPT=71046), 1/11/2021, 9:25 AM.       INDICATIONS: Cough for4 days.       TECHNIQUE:   Two views.         FINDINGS:    CARDIAC/VASC: No cardiac silhouette abnormality or cardiomegaly.     Unremarkable pulmonary vasculature.     MEDIAST/LANG: No visible mass or adenopathy.    LUNGS/PLEURA: No significant pulmonary parenchymal abnormalities.  No    effusion or pleural thickening.     BONES: No fracture or visible bony lesion.    OTHER: Negative.                     =====   CONCLUSION:        Negative for radiographically evident acute intrathoracic process.             Dictated by (CST): Celso Prado MD on 7/25/2024 at 11:43 AM        Finalized by (CST): Celso Prado MD on 7/25/2024 at 11:44 AM                   Medical Decision Making  DDx includes viral URI versus covid versus bronchitis versus pneumonia versus other.  Patient is overall very well-appearing with stable vitals and tolerating oral intake. No hypoxia or signs of respiratory distress.  COVID-negative.  Chest x-ray reviewed, no evidence of acute cardiopulmonary process.  Discussed results with patient  which patient was very reassured by.  Discussed supportive care for suspected viral illness including rest, increased fluid intake, and OTC Tylenol/Motrin as needed for pain or fevers.  Rx Tessalon for cough as needed.  Discussed infection control.  Instructed patient to go directly to nearest ER with any worsening or concerning symptoms.  Follow-up with PCP.  Work note provided.    Amount and/or Complexity of Data Reviewed  Labs: ordered.  Radiology: ordered and independent interpretation performed.    Risk  OTC drugs.  Prescription drug management.        Diagnosis:    ICD-10-CM    1. Viral upper respiratory illness  J06.9           All results reviewed and discussed with patient/patient's family. Patient/patient's family verbalize excellent understanding of instructions and feels comfortable with plan. All of patient's/patient's family's questions were addressed.   See AVS for detailed discharge instructions for your condition today.    Follow Up with:  Monique Nicolas MD  [address] [place] [id number]  [phone]            Note: This document was dictated using Dragon medical dictation software.  Proofreading was performed to the best of my ability, but errors may be present.    Tasha Siegel PA-C

## 2024-08-09 ENCOUNTER — PATIENT MESSAGE (OUTPATIENT)
Dept: INTERNAL MEDICINE CLINIC | Facility: CLINIC | Age: 49
End: 2024-08-09

## 2024-08-09 DIAGNOSIS — Z12.31 BREAST CANCER SCREENING BY MAMMOGRAM: Primary | ICD-10-CM

## 2024-08-11 NOTE — TELEPHONE ENCOUNTER
From: Vinod Abbasi  To: Monique Nicolas  Sent: 8/9/2024 11:50 AM CDT  Subject: Mammogram     Hi Dr. Nicolas    I hope all is well.   I am due for my mammogram on September 1st. I am hoping you could please write an order for it. Thank you.    Have a great one.    Regards,  Vinod Abbasi

## 2024-08-11 NOTE — TELEPHONE ENCOUNTER
Dr. Nicolas, patient is requesting a mammogram order. Previous mammogram was completed 09/01/23. Order has been pended.     Impression   CONCLUSION:       Benign lesion right breast 3 o'clock unchanged since June 2021. Otherwise no mammographic evidence of malignancy.       BI-RADS CATEGORY:    DIAGNOSTIC CATEGORY 2--BENIGN FINDING:       RECOMMENDATIONS:  ROUTINE MAMMOGRAM AND CLINICAL EVALUATION IN 12 MONTHS.

## 2024-09-07 ENCOUNTER — HOSPITAL ENCOUNTER (OUTPATIENT)
Dept: MAMMOGRAPHY | Age: 49
Discharge: HOME | End: 2024-09-07
Attending: INTERNAL MEDICINE
Payer: COMMERCIAL

## 2024-09-07 DIAGNOSIS — Z12.31 BREAST CANCER SCREENING BY MAMMOGRAM: ICD-10-CM

## 2024-09-07 PROCEDURE — 77067 SCR MAMMO BI INCL CAD: CPT | Performed by: INTERNAL MEDICINE

## 2024-09-07 PROCEDURE — 77063 BREAST TOMOSYNTHESIS BI: CPT | Performed by: INTERNAL MEDICINE

## 2024-09-22 DIAGNOSIS — L30.9 DERMATITIS: ICD-10-CM

## 2024-09-22 DIAGNOSIS — L30.8 PRURITIC DERMATITIS: ICD-10-CM

## 2024-09-26 ENCOUNTER — LAB ENCOUNTER (OUTPATIENT)
Dept: LAB | Age: 49
End: 2024-09-26
Attending: INTERNAL MEDICINE
Payer: COMMERCIAL

## 2024-09-26 ENCOUNTER — OFFICE VISIT (OUTPATIENT)
Dept: INTERNAL MEDICINE CLINIC | Facility: CLINIC | Age: 49
End: 2024-09-26
Payer: COMMERCIAL

## 2024-09-26 VITALS
SYSTOLIC BLOOD PRESSURE: 127 MMHG | HEART RATE: 91 BPM | HEIGHT: 64 IN | DIASTOLIC BLOOD PRESSURE: 84 MMHG | BODY MASS INDEX: 27.96 KG/M2 | WEIGHT: 163.81 LBS

## 2024-09-26 DIAGNOSIS — E55.9 VITAMIN D DEFICIENCY: ICD-10-CM

## 2024-09-26 DIAGNOSIS — D50.8 IRON DEFICIENCY ANEMIA SECONDARY TO INADEQUATE DIETARY IRON INTAKE: ICD-10-CM

## 2024-09-26 DIAGNOSIS — Z00.00 PHYSICAL EXAM, ANNUAL: Primary | ICD-10-CM

## 2024-09-26 DIAGNOSIS — Z23 NEED FOR VACCINATION: ICD-10-CM

## 2024-09-26 DIAGNOSIS — R07.9 LEFT-SIDED CHEST PAIN: ICD-10-CM

## 2024-09-26 DIAGNOSIS — Z00.00 PHYSICAL EXAM, ANNUAL: ICD-10-CM

## 2024-09-26 DIAGNOSIS — G89.29 CHRONIC LEFT SHOULDER PAIN: ICD-10-CM

## 2024-09-26 DIAGNOSIS — M25.512 CHRONIC LEFT SHOULDER PAIN: ICD-10-CM

## 2024-09-26 DIAGNOSIS — L72.0 CYST OF SKIN AND SUBCUTANEOUS TISSUE: ICD-10-CM

## 2024-09-26 LAB
ALBUMIN SERPL-MCNC: 4.4 G/DL (ref 3.2–4.8)
ALBUMIN/GLOB SERPL: 1.5 {RATIO} (ref 1–2)
ALP LIVER SERPL-CCNC: 61 U/L
ALT SERPL-CCNC: 23 U/L
ANION GAP SERPL CALC-SCNC: 7 MMOL/L (ref 0–18)
AST SERPL-CCNC: 23 U/L (ref ?–34)
ATRIAL RATE: 80 BPM
BILIRUB SERPL-MCNC: 0.4 MG/DL (ref 0.3–1.2)
BUN BLD-MCNC: 14 MG/DL (ref 9–23)
BUN/CREAT SERPL: 15.9 (ref 10–20)
CALCIUM BLD-MCNC: 9.2 MG/DL (ref 8.7–10.4)
CHLORIDE SERPL-SCNC: 109 MMOL/L (ref 98–112)
CHOLEST SERPL-MCNC: 196 MG/DL (ref ?–200)
CO2 SERPL-SCNC: 26 MMOL/L (ref 21–32)
CREAT BLD-MCNC: 0.88 MG/DL
DEPRECATED HBV CORE AB SER IA-ACNC: 2 NG/ML
DEPRECATED RDW RBC AUTO: 42.5 FL (ref 35.1–46.3)
EGFRCR SERPLBLD CKD-EPI 2021: 82 ML/MIN/1.73M2 (ref 60–?)
ERYTHROCYTE [DISTWIDTH] IN BLOOD BY AUTOMATED COUNT: 15.5 % (ref 11–15)
FASTING PATIENT LIPID ANSWER: YES
FASTING STATUS PATIENT QL REPORTED: YES
GLOBULIN PLAS-MCNC: 3 G/DL (ref 2–3.5)
GLUCOSE BLD-MCNC: 84 MG/DL (ref 70–99)
HCT VFR BLD AUTO: 30.5 %
HDLC SERPL-MCNC: 64 MG/DL (ref 40–59)
HGB BLD-MCNC: 9.5 G/DL
IRON SATN MFR SERPL: 5 %
IRON SERPL-MCNC: 24 UG/DL
LDLC SERPL CALC-MCNC: 122 MG/DL (ref ?–100)
MCH RBC QN AUTO: 23.6 PG (ref 26–34)
MCHC RBC AUTO-ENTMCNC: 31.1 G/DL (ref 31–37)
MCV RBC AUTO: 75.7 FL
NONHDLC SERPL-MCNC: 132 MG/DL (ref ?–130)
OSMOLALITY SERPL CALC.SUM OF ELEC: 294 MOSM/KG (ref 275–295)
P AXIS: 59 DEGREES
P-R INTERVAL: 172 MS
PLATELET # BLD AUTO: 582 10(3)UL (ref 150–450)
POTASSIUM SERPL-SCNC: 4.1 MMOL/L (ref 3.5–5.1)
PROT SERPL-MCNC: 7.4 G/DL (ref 5.7–8.2)
Q-T INTERVAL: 364 MS
QRS DURATION: 80 MS
QTC CALCULATION (BEZET): 419 MS
R AXIS: 41 DEGREES
RBC # BLD AUTO: 4.03 X10(6)UL
SODIUM SERPL-SCNC: 142 MMOL/L (ref 136–145)
T AXIS: -1 DEGREES
TIBC SERPL-MCNC: 508 UG/DL (ref 250–425)
TRANSFERRIN SERPL-MCNC: 341 MG/DL (ref 250–380)
TRIGL SERPL-MCNC: 53 MG/DL (ref 30–149)
TSI SER-ACNC: 1.14 MIU/ML (ref 0.55–4.78)
VENTRICULAR RATE: 80 BPM
VIT D+METAB SERPL-MCNC: 14.6 NG/ML (ref 30–100)
VLDLC SERPL CALC-MCNC: 9 MG/DL (ref 0–30)
WBC # BLD AUTO: 7.9 X10(3) UL (ref 4–11)

## 2024-09-26 PROCEDURE — 84466 ASSAY OF TRANSFERRIN: CPT

## 2024-09-26 PROCEDURE — 82306 VITAMIN D 25 HYDROXY: CPT

## 2024-09-26 PROCEDURE — 84443 ASSAY THYROID STIM HORMONE: CPT

## 2024-09-26 PROCEDURE — 99396 PREV VISIT EST AGE 40-64: CPT | Performed by: INTERNAL MEDICINE

## 2024-09-26 PROCEDURE — 93010 ELECTROCARDIOGRAM REPORT: CPT | Performed by: STUDENT IN AN ORGANIZED HEALTH CARE EDUCATION/TRAINING PROGRAM

## 2024-09-26 PROCEDURE — 80053 COMPREHEN METABOLIC PANEL: CPT

## 2024-09-26 PROCEDURE — 82728 ASSAY OF FERRITIN: CPT

## 2024-09-26 PROCEDURE — 3074F SYST BP LT 130 MM HG: CPT | Performed by: INTERNAL MEDICINE

## 2024-09-26 PROCEDURE — 99213 OFFICE O/P EST LOW 20 MIN: CPT | Performed by: INTERNAL MEDICINE

## 2024-09-26 PROCEDURE — 3079F DIAST BP 80-89 MM HG: CPT | Performed by: INTERNAL MEDICINE

## 2024-09-26 PROCEDURE — 90715 TDAP VACCINE 7 YRS/> IM: CPT | Performed by: INTERNAL MEDICINE

## 2024-09-26 PROCEDURE — 90471 IMMUNIZATION ADMIN: CPT | Performed by: INTERNAL MEDICINE

## 2024-09-26 PROCEDURE — 83540 ASSAY OF IRON: CPT

## 2024-09-26 PROCEDURE — 3008F BODY MASS INDEX DOCD: CPT | Performed by: INTERNAL MEDICINE

## 2024-09-26 PROCEDURE — 93005 ELECTROCARDIOGRAM TRACING: CPT

## 2024-09-26 PROCEDURE — 36415 COLL VENOUS BLD VENIPUNCTURE: CPT

## 2024-09-26 PROCEDURE — 80061 LIPID PANEL: CPT

## 2024-09-26 PROCEDURE — 85027 COMPLETE CBC AUTOMATED: CPT

## 2024-09-26 RX ORDER — MUPIROCIN 20 MG/G
OINTMENT TOPICAL 3 TIMES DAILY
COMMUNITY

## 2024-09-26 NOTE — PROGRESS NOTES
Vinod Abbasi is a 49 year old female.  Chief Complaint   Patient presents with    Physical       HPI:   Patient comes for her annual physical  C/C annual physical  C/o overall doing well without any complaints  Patient would like to have her labs done noted that she is taking iron but would like her hemoglobin checked, has seen hematologist in the past, may benefit from iron infusions           History  11/18 visit   C/o rash  No changes in soaps detergents fabric softeners perfumes etc.  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  Anemia        Current Outpatient Medications   Medication Sig Dispense Refill    mupirocin 2 % External Ointment Apply topically 3 (three) times daily.      clobetasol 0.05 % External Ointment Apply to affected areas on hands daily as needed. 60 g 3    Ferrous Sulfate 325 (65 Fe) MG Oral Tab Take 1 tablet (325 mg total) by mouth daily with food. 90 tablet 1    Multiple Vitamins-Minerals (MULTI-VITAMIN/MINERALS) Oral Tab Take 1 tablet by mouth daily.      triamcinolone 0.1 % External Cream Apply daily  as needed 80 g 3    benzonatate 200 MG Oral Cap Take 1 capsule (200 mg total) by mouth 3 (three) times daily as needed for cough. (Patient not taking: Reported on 2024) 20 capsule 0      Past Medical History:    Menorrhagia with regular cycle      Past Surgical History:   Procedure Laterality Date    Hemorrhoidectomy  2006                Other surgical history      exploratory lap after giving birth due to extra uterine bleed due to uterine peroration      Social History:  Social History     Socioeconomic History    Marital status:    Tobacco Use    Smoking status: Never    Smokeless tobacco: Never   Vaping Use    Vaping status: Never Used   Substance and Sexual Activity    Alcohol use: Yes     Alcohol/week: 1.0 standard drink of alcohol     Types: 1 Glasses of wine per week     Comment: Occasionally    Drug use: Never   Other Topics Concern    Reaction to local anesthetic No   Social History Narrative    Vinod is  x 19 yrs. She has 2 children. 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 She lives with her family in [place], [place]        REVIEW OF SYSTEMS:   GENERAL HEALTH: No fevers, chills, sweats, fatigue  VISION: No recent vision problems, blurry vision or double vision  HEENT: No decreased hearing ear pain nasal congestion or sore throat  SKIN: denies any unusual skin lesions or rashes  RESPIRATORY: denies shortness of breath, cough, wheezing  CARDIOVASCULAR: denies chest pain on exertion, palpitations, swelling in feet  GI: denies abdominal pain and denies heartburn, nausea or vomiting  : No Pain on urination, change in the color of urine, discharge, urinating frequently  MUS: No back pain, joint pain, muscle pain--left shoulder , chest discomfort   NEURO: denies headaches , anxiety, depression    EXAM:   /84   Pulse 91   Ht 5' 4\" (1.626 m)   Wt 163 lb 12.8 oz (74.3 kg)   LMP 2024 (Approximate)   BMI 28.12 kg/m²   GENERAL: well developed, well nourished,in  no apparent distress  SKIN: no rashes,no suspicious lesions, 1 cm mobile swelling noted in the right side supraclavicular region in the back   HEENT: atraumatic, normocephalic,ears and throat are clear, no frontal or maxillary sinus tenderness, pupils equal reactive to light body, extra ocular  muscles intact  NECK: supple,no adenopathy,nontender   LUNGS: clear to auscultation, no wheeze  CARDIO: RRR without murmur  GI: good BS's,no masses or tenderness  EXTREMITIES: no cyanosis, or edema  BREAST: Bilateral breasts without any lumps, bilateral axilla without any lymphadenopathy, no nipple retraction or  discharge good range of motion bilateral shoulders without any swelling or redness        ASSESSMENT AND PLAN:   Diagnoses and all orders for this visit:    Physical exam, annual  -     Comp Metabolic Panel (14); Future  -     Lipid Panel; Future  -     TSH W Reflex To Free T4; Future  -     Vitamin D; Future  -     CBC, Platelet; No Differential; Future  -     Iron And Tibc [E]; Future  -     Ferritin [E]; Future  Advised patient to watch what she is exercise, seatbelt use no texting driving, sunscreen use advised  Need for vaccination  -     TETANUS, DIPHTHERIA TOXOIDS AND ACELLULAR PERTUSIS VACCINE (TDAP), >7 YEARS, IM USE    Iron deficiency anemia secondary to inadequate dietary iron intake  -     CBC, Platelet; No Differential; Future  -     Iron And Tibc [E]; Future  -     Ferritin [E]; Future  Recheck level, may need to see heme-onc again if still low  Vitamin D deficiency  -     Vitamin D; Future  Recheck  Left-sided chest pain  -     EKG 12 Lead; Future  -     CARD NUC STRESS TEST DOBUTAMINE (CPT 60328/73475/); Future  Will check baseline EKG and stress test  Chronic left shoulder pain  -     Cancel: z [facility] XR SHOULDER, (1 VIEW), LEFT (CPT=73020); Future  -     XR SHOULDER, COMPLETE (MIN 2 VIEWS), LEFT (CPT=73030); Future  ?  Arthritis versus muscle pull, check baseline x-ray, advised to take  ibuprofen twice a day for the next 7 to 10 days after meals  Cyst of skin and subcutaneous tissue  -     Surgery Referral - In Network    As patient is concerned about this, will refer      preventive medicine   Labs -- 4/2023 , 8/2023  reviewed   Mammogram - 9/2024 normal                 Did clinical breast , reviewed breast exam   Pap- 10/19 normal dr mondragon and 6/2023   cscope - seen in [place] 8/2023 dr flowers , small TA, repeat colonoscopy in 7 years   Will get flu shot at work  Tdap today    The patient indicates understanding of these issues and agrees to the plan.  No follow-ups on file.

## 2024-09-27 RX ORDER — CLOBETASOL PROPIONATE 0.5 MG/G
OINTMENT TOPICAL
Qty: 60 G | Refills: 3 | Status: SHIPPED | OUTPATIENT
Start: 2024-09-27

## 2024-09-27 NOTE — TELEPHONE ENCOUNTER
Please review. Protocol Failed; No Protocol    Requested Prescriptions   Pending Prescriptions Disp Refills    clobetasol 0.05 % External Ointment 60 g 3     Sig: Apply to affected areas on hands daily as needed.       There is no refill protocol information for this order            Future Appointments         Provider Department Appt Notes    In 1 year Monique Nicolas MD [facility]           Recent Outpatient Visits              Yesterday Physical exam, annual    [facility] Monique Nicolas MD    Office Visit    1 year ago Other iron deficiency anemia    [facility] Gregg Gloria MD    Office Visit    1 year ago Physical exam, annual    [facility] Monique Nicolas MD    Office Visit    1 year ago Diarrhea of presumed infectious origin    [facility] Monique Nicolas MD    Office Visit    2 years ago Physical exam, annual    [facility] Monique Nicolas MD    Office Visit

## 2024-09-28 RX ORDER — ERGOCALCIFEROL 1.25 MG/1
50000 CAPSULE, LIQUID FILLED ORAL WEEKLY
Qty: 4 CAPSULE | Refills: 3 | Status: SHIPPED | OUTPATIENT
Start: 2024-09-28

## 2024-10-25 ENCOUNTER — HOSPITAL ENCOUNTER (OUTPATIENT)
Dept: GENERAL RADIOLOGY | Age: 49
Discharge: HOME | End: 2024-10-25
Attending: INTERNAL MEDICINE
Payer: COMMERCIAL

## 2024-10-25 DIAGNOSIS — M25.512 CHRONIC LEFT SHOULDER PAIN: ICD-10-CM

## 2024-10-25 DIAGNOSIS — G89.29 CHRONIC LEFT SHOULDER PAIN: ICD-10-CM

## 2024-10-25 PROCEDURE — 73030 X-RAY EXAM OF SHOULDER: CPT | Performed by: INTERNAL MEDICINE

## 2024-12-05 NOTE — TELEPHONE ENCOUNTER
Pt is asking for lab orders for antibody testing as well as CBC and CMP. Advised and scheduled  virtual visit as the requests relate to recent COVID infection. Anesthesia Volume In Cc: 0.5 Consent: The patient's consent was obtained including but not limited to risks of crusting, scabbing, blistering, scarring, darker or lighter pigmentary change, recurrence, incomplete removal and infection. Price (Use Numbers Only, No Special Characters Or $): 200 Post-Care Instructions: I reviewed with the patient in detail post-care instructions. Patient is to wear sunprotection, and avoid picking at any of the treated lesions. Pt may apply Vaseline to crusted or scabbing areas. Detail Level: Detailed

## 2025-03-20 DIAGNOSIS — L30.9 DERMATITIS: ICD-10-CM

## 2025-03-20 DIAGNOSIS — L30.8 PRURITIC DERMATITIS: ICD-10-CM

## 2025-03-24 RX ORDER — CLOBETASOL PROPIONATE 0.5 MG/G
OINTMENT TOPICAL
Qty: 60 G | Refills: 3 | OUTPATIENT
Start: 2025-03-24

## 2025-03-24 NOTE — TELEPHONE ENCOUNTER
Refills remain at [facility]   Outpatient Medication Detail   Disp Refills Start End    clobetasol 0.05 % External Ointment 60 g 3 9/27/2024 --    Sig: Apply to affected areas on hands daily as needed.    Sent to pharmacy as: Clobetasol Propionate 0.05 % External Ointment (Temovate)    E-Prescribing Status: Receipt confirmed by pharmacy (9/27/2024  5:27 PM CDT)    Associated Diagnoses  Pruritic dermatitis      Dermatitis      Pharmacy  [id number] - [place], [place] - [facility] [phone], [phone]

## 2025-04-14 DIAGNOSIS — L30.9 DERMATITIS: ICD-10-CM

## 2025-04-14 DIAGNOSIS — L30.8 PRURITIC DERMATITIS: ICD-10-CM

## 2025-04-16 RX ORDER — CLOBETASOL PROPIONATE 0.5 MG/G
OINTMENT TOPICAL
Qty: 60 G | Refills: 3 | OUTPATIENT
Start: 2025-04-16

## (undated) NOTE — LETTER
Date & Time: 7/25/2024, 11:51 AM  Patient: Vinod Abbasi  Encounter Provider(s):    Tasha Siegel PA-C       To Whom It May Concern:    Vinod Abbasi was seen and treated in our department on 7/25/2024. She can return to work 7/26/2024.    If you have any questions or concerns, please do not hesitate to call.        _____________________________  Physician/APC Signature

## (undated) NOTE — LETTER
Date & Time: 7/25/2024, 11:51 AM  Patient: Vinod Abbasi  Encounter Provider(s):    Tasha Siegel PA-C       To Whom It May Concern:    Vinod Abbasi was seen and treated in our department on 7/25/2024. She can return to work 7/29/2024.    If you have any questions or concerns, please do not hesitate to call.        _____________________________  Physician/APC Signature

## (undated) NOTE — LETTER
09/25/19        [phone] [id number]      Dear Tammie Gilliland,    [facility] records indicate that you have outstanding lab work and or testing that was ordered for you and has not yet been completed:  Orders Placed This Encounter

## (undated) NOTE — LETTER
12/11/2019          To Whom It May Concern:    Dina Abdi is currently under my medical care and may not return to work at this time. Please excuse Terrie Swift for 2 days. She may return to work on 12/16/19. Activity is restricted as follows: none.     If

## (undated) NOTE — LETTER
09/10/18        [id number]      Dear Angela Briceno,    [facility] records indicate that you have outstanding lab work and or testing that was ordered for you and has not yet been completed:  Orders Placed This Encounter      Co

## (undated) NOTE — LETTER
4/12/2023          To Whom It May Concern:    Alberto Milner is currently under my medical care and may not return to work at this time. Please excuse Gage Ольга for 1 days. She may return to work on 4/17/2023. Activity is restricted as follows: none. If you require additional information please contact our office.         Sincerely,    Keron Blanton MD          Document generated by:  Keron Blanton MD